# Patient Record
Sex: FEMALE | Race: WHITE | NOT HISPANIC OR LATINO
[De-identification: names, ages, dates, MRNs, and addresses within clinical notes are randomized per-mention and may not be internally consistent; named-entity substitution may affect disease eponyms.]

---

## 2017-01-10 ENCOUNTER — CHARTING TRANS (OUTPATIENT)
Dept: OTHER | Age: 77
End: 2017-01-10

## 2017-01-10 ASSESSMENT — PAIN SCALES - GENERAL: PAINLEVEL_OUTOF10: 0

## 2017-08-11 ENCOUNTER — CHARTING TRANS (OUTPATIENT)
Dept: OTHER | Age: 77
End: 2017-08-11

## 2017-08-14 ENCOUNTER — CHARTING TRANS (OUTPATIENT)
Dept: OTHER | Age: 77
End: 2017-08-14

## 2017-12-20 ENCOUNTER — CHARTING TRANS (OUTPATIENT)
Dept: OTHER | Age: 77
End: 2017-12-20

## 2018-02-26 ENCOUNTER — CHARTING TRANS (OUTPATIENT)
Dept: OTHER | Age: 78
End: 2018-02-26

## 2018-08-17 ENCOUNTER — CHARTING TRANS (OUTPATIENT)
Dept: OTHER | Age: 78
End: 2018-08-17

## 2018-08-27 ENCOUNTER — CHARTING TRANS (OUTPATIENT)
Dept: OTHER | Age: 78
End: 2018-08-27

## 2018-09-14 ENCOUNTER — CHARTING TRANS (OUTPATIENT)
Dept: OTHER | Age: 78
End: 2018-09-14

## 2018-10-09 ENCOUNTER — CHARTING TRANS (OUTPATIENT)
Dept: OTHER | Age: 78
End: 2018-10-09

## 2018-10-31 ENCOUNTER — CHARTING TRANS (OUTPATIENT)
Dept: OTHER | Age: 78
End: 2018-10-31

## 2018-11-01 VITALS
DIASTOLIC BLOOD PRESSURE: 68 MMHG | RESPIRATION RATE: 16 BRPM | BODY MASS INDEX: 26.46 KG/M2 | HEIGHT: 64 IN | HEART RATE: 68 BPM | SYSTOLIC BLOOD PRESSURE: 116 MMHG | WEIGHT: 155 LBS

## 2018-11-03 VITALS
HEIGHT: 64 IN | BODY MASS INDEX: 26.63 KG/M2 | SYSTOLIC BLOOD PRESSURE: 124 MMHG | DIASTOLIC BLOOD PRESSURE: 70 MMHG | HEART RATE: 68 BPM | WEIGHT: 156 LBS

## 2018-11-03 VITALS
HEIGHT: 64 IN | WEIGHT: 155 LBS | BODY MASS INDEX: 26.46 KG/M2 | SYSTOLIC BLOOD PRESSURE: 116 MMHG | DIASTOLIC BLOOD PRESSURE: 86 MMHG

## 2018-11-06 VITALS
HEIGHT: 64 IN | HEART RATE: 78 BPM | WEIGHT: 148 LBS | DIASTOLIC BLOOD PRESSURE: 62 MMHG | RESPIRATION RATE: 16 BRPM | SYSTOLIC BLOOD PRESSURE: 110 MMHG | BODY MASS INDEX: 25.27 KG/M2

## 2018-11-12 ENCOUNTER — CHARTING TRANS (OUTPATIENT)
Dept: OTHER | Age: 78
End: 2018-11-12

## 2018-11-27 VITALS
WEIGHT: 150 LBS | HEART RATE: 68 BPM | OXYGEN SATURATION: 97 % | HEIGHT: 64 IN | BODY MASS INDEX: 25.61 KG/M2 | DIASTOLIC BLOOD PRESSURE: 72 MMHG | RESPIRATION RATE: 16 BRPM | SYSTOLIC BLOOD PRESSURE: 120 MMHG

## 2018-11-27 VITALS
SYSTOLIC BLOOD PRESSURE: 124 MMHG | OXYGEN SATURATION: 97 % | HEART RATE: 73 BPM | BODY MASS INDEX: 27.23 KG/M2 | TEMPERATURE: 97.1 F | DIASTOLIC BLOOD PRESSURE: 72 MMHG | WEIGHT: 159.5 LBS | HEIGHT: 64 IN

## 2018-11-27 VITALS
HEIGHT: 64 IN | BODY MASS INDEX: 26.8 KG/M2 | SYSTOLIC BLOOD PRESSURE: 126 MMHG | RESPIRATION RATE: 16 BRPM | HEART RATE: 68 BPM | DIASTOLIC BLOOD PRESSURE: 84 MMHG | WEIGHT: 157 LBS

## 2018-11-27 VITALS
SYSTOLIC BLOOD PRESSURE: 134 MMHG | TEMPERATURE: 96.9 F | HEART RATE: 68 BPM | HEIGHT: 64 IN | BODY MASS INDEX: 27.04 KG/M2 | WEIGHT: 158.38 LBS | DIASTOLIC BLOOD PRESSURE: 88 MMHG | OXYGEN SATURATION: 98 %

## 2018-12-11 ENCOUNTER — ANCILLARY PROCEDURE (OUTPATIENT)
Dept: CARDIOLOGY | Age: 78
End: 2018-12-11
Attending: INTERNAL MEDICINE

## 2018-12-11 DIAGNOSIS — I44.2 AV BLOCK, 3RD DEGREE (CMD): ICD-10-CM

## 2018-12-11 PROCEDURE — 93280 PM DEVICE PROGR EVAL DUAL: CPT | Performed by: INTERNAL MEDICINE

## 2019-01-02 ENCOUNTER — TELEPHONE (OUTPATIENT)
Dept: CARDIOLOGY | Age: 79
End: 2019-01-02

## 2019-01-03 ENCOUNTER — TELEPHONE (OUTPATIENT)
Dept: NEUROLOGY | Age: 79
End: 2019-01-03

## 2019-01-03 RX ORDER — BUTORPHANOL TARTRATE 10 MG/ML
SPRAY NASAL
Qty: 5 ML | Refills: 0
Start: 2019-01-03 | End: 2019-08-22 | Stop reason: SDUPTHER

## 2019-01-03 RX ORDER — BUTORPHANOL TARTRATE 10 MG/ML
SPRAY NASAL
COMMUNITY
End: 2019-01-03 | Stop reason: SDUPTHER

## 2019-01-14 ENCOUNTER — TELEPHONE (OUTPATIENT)
Dept: CARDIOLOGY | Age: 79
End: 2019-01-14

## 2019-01-27 RX ORDER — LANOLIN ALCOHOL/MO/W.PET/CERES
CREAM (GRAM) TOPICAL
COMMUNITY
Start: 2018-10-09 | End: 2019-10-09

## 2019-01-27 RX ORDER — MAGNESIUM OXIDE 400 MG/1
TABLET ORAL
COMMUNITY
Start: 2018-10-02 | End: 2019-10-02

## 2019-01-27 RX ORDER — QUETIAPINE FUMARATE 25 MG/1
TABLET, FILM COATED ORAL
COMMUNITY
Start: 2018-10-09 | End: 2019-04-17 | Stop reason: SDUPTHER

## 2019-01-27 RX ORDER — POTASSIUM CHLORIDE 20 MEQ/1
TABLET, EXTENDED RELEASE ORAL
COMMUNITY
Start: 2018-02-13 | End: 2019-04-03 | Stop reason: SDUPTHER

## 2019-01-28 RX ORDER — AMLODIPINE BESYLATE 5 MG/1
TABLET ORAL
COMMUNITY
End: 2019-11-06 | Stop reason: SDUPTHER

## 2019-02-19 ENCOUNTER — TELEPHONE (OUTPATIENT)
Dept: NEUROLOGY | Age: 79
End: 2019-02-19

## 2019-03-29 ENCOUNTER — TELEPHONE (OUTPATIENT)
Dept: OTOLARYNGOLOGY | Age: 79
End: 2019-03-29

## 2019-04-01 ENCOUNTER — ANCILLARY PROCEDURE (OUTPATIENT)
Dept: CARDIOLOGY | Age: 79
End: 2019-04-01
Attending: INTERNAL MEDICINE

## 2019-04-01 DIAGNOSIS — I48.91 ATRIAL FIBRILLATION AND FLUTTER (CMD): ICD-10-CM

## 2019-04-01 DIAGNOSIS — I48.92 ATRIAL FIBRILLATION AND FLUTTER (CMD): ICD-10-CM

## 2019-04-01 DIAGNOSIS — I44.2 ATRIOVENTRICULAR BLOCK, COMPLETE (CMD): ICD-10-CM

## 2019-04-01 PROCEDURE — 93294 REM INTERROG EVL PM/LDLS PM: CPT | Performed by: INTERNAL MEDICINE

## 2019-04-02 ENCOUNTER — OFFICE VISIT (OUTPATIENT)
Dept: OTOLARYNGOLOGY | Age: 79
End: 2019-04-02

## 2019-04-02 VITALS
OXYGEN SATURATION: 97 % | WEIGHT: 148 LBS | TEMPERATURE: 97.6 F | SYSTOLIC BLOOD PRESSURE: 118 MMHG | HEART RATE: 72 BPM | BODY MASS INDEX: 25.27 KG/M2 | DIASTOLIC BLOOD PRESSURE: 74 MMHG | HEIGHT: 64 IN

## 2019-04-02 DIAGNOSIS — R04.0 EPISTAXIS: Primary | ICD-10-CM

## 2019-04-02 PROCEDURE — 99213 OFFICE O/P EST LOW 20 MIN: CPT | Performed by: NURSE PRACTITIONER

## 2019-04-02 RX ORDER — SIMVASTATIN 40 MG
40 TABLET ORAL NIGHTLY
COMMUNITY
Start: 2018-04-03

## 2019-04-02 RX ORDER — ECHINACEA 400 MG
2 CAPSULE ORAL DAILY
COMMUNITY

## 2019-04-02 RX ORDER — HYDROCODONE BITARTRATE AND ACETAMINOPHEN 5; 325 MG/1; MG/1
TABLET ORAL
Refills: 0 | COMMUNITY
Start: 2019-03-22 | End: 2019-12-12 | Stop reason: ALTCHOICE

## 2019-04-02 RX ORDER — LANOLIN ALCOHOL/MO/W.PET/CERES
3 CREAM (GRAM) TOPICAL NIGHTLY
COMMUNITY

## 2019-04-02 RX ORDER — ALENDRONATE SODIUM 70 MG/1
TABLET ORAL
COMMUNITY
Start: 2018-10-02 | End: 2019-12-12

## 2019-04-02 RX ORDER — ZOLPIDEM TARTRATE 5 MG/1
TABLET ORAL
Refills: 1 | COMMUNITY
Start: 2019-02-28 | End: 2019-08-02 | Stop reason: SDUPTHER

## 2019-04-02 ASSESSMENT — ENCOUNTER SYMPTOMS
GASTROINTESTINAL NEGATIVE: 1
CONSTITUTIONAL NEGATIVE: 1
EYES NEGATIVE: 1
RESPIRATORY NEGATIVE: 1
PSYCHIATRIC NEGATIVE: 1

## 2019-04-03 RX ORDER — POTASSIUM CHLORIDE 20 MEQ/1
TABLET, EXTENDED RELEASE ORAL
Qty: 90 TABLET | Refills: 1 | Status: SHIPPED | OUTPATIENT
Start: 2019-04-03

## 2019-04-16 ENCOUNTER — TELEPHONE (OUTPATIENT)
Dept: NEUROLOGY | Age: 79
End: 2019-04-16

## 2019-04-17 RX ORDER — QUETIAPINE FUMARATE 25 MG/1
50 TABLET, FILM COATED ORAL DAILY
Qty: 60 TABLET | Refills: 1 | Status: SHIPPED | OUTPATIENT
Start: 2019-04-17 | End: 2019-05-17

## 2019-04-23 PROCEDURE — 93296 REM INTERROG EVL PM/IDS: CPT | Performed by: INTERNAL MEDICINE

## 2019-05-21 ENCOUNTER — OFFICE VISIT (OUTPATIENT)
Dept: NEUROLOGY | Age: 79
End: 2019-05-21

## 2019-05-21 VITALS
DIASTOLIC BLOOD PRESSURE: 70 MMHG | BODY MASS INDEX: 25.61 KG/M2 | SYSTOLIC BLOOD PRESSURE: 104 MMHG | HEIGHT: 64 IN | WEIGHT: 150 LBS

## 2019-05-21 DIAGNOSIS — I73.9 SMALL VESSEL DISEASE (CMD): ICD-10-CM

## 2019-05-21 DIAGNOSIS — F03.91 DEMENTIA WITH BEHAVIORAL DISTURBANCE, UNSPECIFIED DEMENTIA TYPE: Primary | ICD-10-CM

## 2019-05-21 DIAGNOSIS — F31.9 BIPOLAR AFFECTIVE DISORDER, REMISSION STATUS UNSPECIFIED (CMD): ICD-10-CM

## 2019-05-21 DIAGNOSIS — G43.909 MIGRAINE WITHOUT STATUS MIGRAINOSUS, NOT INTRACTABLE, UNSPECIFIED MIGRAINE TYPE: ICD-10-CM

## 2019-05-21 DIAGNOSIS — E53.8 VITAMIN B12 DEFICIENCY: ICD-10-CM

## 2019-05-21 PROBLEM — F03.918 DEMENTIA WITH BEHAVIORAL DISTURBANCE (CMD): Status: ACTIVE | Noted: 2019-05-21

## 2019-05-21 PROBLEM — G25.81 RLS (RESTLESS LEGS SYNDROME): Status: ACTIVE | Noted: 2019-05-21

## 2019-05-21 PROBLEM — G25.81 RLS (RESTLESS LEGS SYNDROME): Status: RESOLVED | Noted: 2019-05-21 | Resolved: 2019-05-21

## 2019-05-21 PROCEDURE — 99214 OFFICE O/P EST MOD 30 MIN: CPT | Performed by: SPECIALIST

## 2019-05-21 RX ORDER — QUETIAPINE FUMARATE 25 MG/1
25 TABLET, FILM COATED ORAL
COMMUNITY
End: 2019-06-26 | Stop reason: SDUPTHER

## 2019-05-21 ASSESSMENT — ENCOUNTER SYMPTOMS
WEAKNESS: 0
EYES NEGATIVE: 1
NUMBNESS: 0
GASTROINTESTINAL NEGATIVE: 1
PSYCHIATRIC NEGATIVE: 1
HEADACHES: 1
SPEECH DIFFICULTY: 0
RESPIRATORY NEGATIVE: 1
CONSTITUTIONAL NEGATIVE: 1
DIZZINESS: 0
ENDOCRINE NEGATIVE: 1

## 2019-05-21 ASSESSMENT — MINI MENTAL STATE EXAM: SUM ALL MMSE QUESTIONS FOR TOTAL SCORE [OUT OF 30].: 18

## 2019-05-21 ASSESSMENT — VISUAL ACUITY: VA_NORMAL: 1

## 2019-06-26 ENCOUNTER — TELEPHONE (OUTPATIENT)
Dept: NEUROLOGY | Age: 79
End: 2019-06-26

## 2019-06-26 RX ORDER — QUETIAPINE FUMARATE 25 MG/1
50 TABLET, FILM COATED ORAL AT BEDTIME
Qty: 180 TABLET | Refills: 1 | Status: SHIPPED | OUTPATIENT
Start: 2019-06-26 | End: 2019-09-24

## 2019-07-01 ENCOUNTER — ANCILLARY PROCEDURE (OUTPATIENT)
Dept: CARDIOLOGY | Age: 79
End: 2019-07-01
Attending: INTERNAL MEDICINE

## 2019-07-01 DIAGNOSIS — I44.2 ATRIOVENTRICULAR BLOCK, COMPLETE (CMD): Primary | ICD-10-CM

## 2019-07-01 DIAGNOSIS — I48.92 ATRIAL FIBRILLATION AND FLUTTER (CMD): ICD-10-CM

## 2019-07-01 DIAGNOSIS — Z95.0 PRESENCE OF CARDIAC PACEMAKER: ICD-10-CM

## 2019-07-01 DIAGNOSIS — I48.91 ATRIAL FIBRILLATION AND FLUTTER (CMD): ICD-10-CM

## 2019-07-22 ENCOUNTER — TELEPHONE (OUTPATIENT)
Dept: CARDIOLOGY | Age: 79
End: 2019-07-22

## 2019-08-02 ENCOUNTER — TELEPHONE (OUTPATIENT)
Dept: NEUROLOGY | Age: 79
End: 2019-08-02

## 2019-08-02 RX ORDER — ZOLPIDEM TARTRATE 5 MG/1
2.5 TABLET ORAL NIGHTLY PRN
Qty: 45 TABLET | Refills: 0
Start: 2019-08-02 | End: 2019-10-31 | Stop reason: SDUPTHER

## 2019-08-22 ENCOUNTER — TELEPHONE (OUTPATIENT)
Dept: NEUROLOGY | Age: 79
End: 2019-08-22

## 2019-08-22 DIAGNOSIS — G43.909 MIGRAINE WITHOUT STATUS MIGRAINOSUS, NOT INTRACTABLE, UNSPECIFIED MIGRAINE TYPE: Primary | ICD-10-CM

## 2019-08-22 RX ORDER — BUTORPHANOL TARTRATE 10 MG/ML
SPRAY NASAL
Qty: 5 ML | Refills: 0 | Status: SHIPPED
Start: 2019-08-22

## 2019-10-01 ENCOUNTER — ANCILLARY PROCEDURE (OUTPATIENT)
Dept: CARDIOLOGY | Age: 79
End: 2019-10-01
Attending: INTERNAL MEDICINE

## 2019-10-01 DIAGNOSIS — I44.2 ATRIOVENTRICULAR BLOCK, COMPLETE (CMD): ICD-10-CM

## 2019-10-01 DIAGNOSIS — I48.92 ATRIAL FIBRILLATION AND FLUTTER (CMD): ICD-10-CM

## 2019-10-01 DIAGNOSIS — Z95.0 CARDIAC PACEMAKER IN SITU: ICD-10-CM

## 2019-10-01 DIAGNOSIS — I48.91 ATRIAL FIBRILLATION AND FLUTTER (CMD): ICD-10-CM

## 2019-10-01 PROCEDURE — 93296 REM INTERROG EVL PM/IDS: CPT | Performed by: INTERNAL MEDICINE

## 2019-10-11 PROCEDURE — 93294 REM INTERROG EVL PM/LDLS PM: CPT | Performed by: INTERNAL MEDICINE

## 2019-10-31 RX ORDER — ZOLPIDEM TARTRATE 5 MG/1
2.5 TABLET ORAL NIGHTLY PRN
Qty: 45 TABLET | Refills: 0 | Status: SHIPPED
Start: 2019-10-31 | End: 2020-01-02 | Stop reason: SDUPTHER

## 2019-11-06 RX ORDER — AMLODIPINE BESYLATE 5 MG/1
TABLET ORAL
Qty: 90 TABLET | Refills: 0 | Status: SHIPPED | OUTPATIENT
Start: 2019-11-06 | End: 2020-02-26 | Stop reason: SDUPTHER

## 2019-12-03 ENCOUNTER — OFFICE VISIT (OUTPATIENT)
Dept: NEUROLOGY | Age: 79
End: 2019-12-03

## 2019-12-03 VITALS
DIASTOLIC BLOOD PRESSURE: 60 MMHG | BODY MASS INDEX: 25.61 KG/M2 | WEIGHT: 150 LBS | HEIGHT: 64 IN | SYSTOLIC BLOOD PRESSURE: 110 MMHG

## 2019-12-03 DIAGNOSIS — F31.9 BIPOLAR AFFECTIVE DISORDER, REMISSION STATUS UNSPECIFIED (CMD): ICD-10-CM

## 2019-12-03 DIAGNOSIS — G43.909 MIGRAINE WITHOUT STATUS MIGRAINOSUS, NOT INTRACTABLE, UNSPECIFIED MIGRAINE TYPE: ICD-10-CM

## 2019-12-03 DIAGNOSIS — E53.8 VITAMIN B12 DEFICIENCY: ICD-10-CM

## 2019-12-03 DIAGNOSIS — I73.9 SMALL VESSEL DISEASE (CMD): ICD-10-CM

## 2019-12-03 DIAGNOSIS — F03.91 DEMENTIA WITH BEHAVIORAL DISTURBANCE, UNSPECIFIED DEMENTIA TYPE: Primary | ICD-10-CM

## 2019-12-03 PROCEDURE — 99214 OFFICE O/P EST MOD 30 MIN: CPT | Performed by: SPECIALIST

## 2019-12-03 ASSESSMENT — ENCOUNTER SYMPTOMS
WEAKNESS: 0
PSYCHIATRIC NEGATIVE: 1
CONSTITUTIONAL NEGATIVE: 1
ENDOCRINE NEGATIVE: 1
RESPIRATORY NEGATIVE: 1
DIZZINESS: 0
SPEECH DIFFICULTY: 0
EYES NEGATIVE: 1
GASTROINTESTINAL NEGATIVE: 1
NUMBNESS: 0
HEADACHES: 1

## 2019-12-05 RX ORDER — MAGNESIUM OXIDE 400 MG/1
TABLET ORAL
Qty: 180 TABLET | Refills: 0 | Status: SHIPPED | OUTPATIENT
Start: 2019-12-05 | End: 2020-02-26 | Stop reason: SDUPTHER

## 2019-12-10 ENCOUNTER — ANCILLARY PROCEDURE (OUTPATIENT)
Dept: CARDIOLOGY | Age: 79
End: 2019-12-10
Attending: INTERNAL MEDICINE

## 2019-12-10 DIAGNOSIS — I48.91 ATRIAL FIBRILLATION AND FLUTTER (CMD): ICD-10-CM

## 2019-12-10 DIAGNOSIS — I48.92 ATRIAL FIBRILLATION AND FLUTTER (CMD): ICD-10-CM

## 2019-12-10 DIAGNOSIS — I44.2 ATRIOVENTRICULAR BLOCK, COMPLETE (CMD): ICD-10-CM

## 2019-12-12 ENCOUNTER — OFFICE VISIT (OUTPATIENT)
Dept: CARDIOLOGY | Age: 79
End: 2019-12-12

## 2019-12-12 VITALS
SYSTOLIC BLOOD PRESSURE: 102 MMHG | DIASTOLIC BLOOD PRESSURE: 62 MMHG | HEART RATE: 60 BPM | BODY MASS INDEX: 27.31 KG/M2 | HEIGHT: 64 IN | WEIGHT: 160 LBS

## 2019-12-12 DIAGNOSIS — I48.0 PAROXYSMAL ATRIAL FIBRILLATION (CMD): Primary | ICD-10-CM

## 2019-12-12 DIAGNOSIS — I10 ESSENTIAL HYPERTENSION: ICD-10-CM

## 2019-12-12 DIAGNOSIS — Z95.0 HISTORY OF CARDIAC PACEMAKER IN SITU: ICD-10-CM

## 2019-12-12 DIAGNOSIS — E78.00 PURE HYPERCHOLESTEROLEMIA: ICD-10-CM

## 2019-12-12 PROCEDURE — 93000 ELECTROCARDIOGRAM COMPLETE: CPT | Performed by: INTERNAL MEDICINE

## 2019-12-12 PROCEDURE — 99214 OFFICE O/P EST MOD 30 MIN: CPT | Performed by: INTERNAL MEDICINE

## 2019-12-12 RX ORDER — METOPROLOL SUCCINATE 25 MG/1
25 TABLET, EXTENDED RELEASE ORAL DAILY
COMMUNITY

## 2019-12-12 RX ORDER — QUETIAPINE FUMARATE 25 MG/1
50 TABLET, FILM COATED ORAL AT BEDTIME
COMMUNITY
End: 2020-06-09

## 2019-12-12 SDOH — HEALTH STABILITY: MENTAL HEALTH: HOW OFTEN DO YOU HAVE A DRINK CONTAINING ALCOHOL?: 2-4 TIMES A MONTH

## 2019-12-12 SDOH — HEALTH STABILITY: MENTAL HEALTH: HOW OFTEN DO YOU HAVE 6 OR MORE DRINKS ON ONE OCCASION?: NEVER

## 2019-12-12 SDOH — HEALTH STABILITY: MENTAL HEALTH: HOW MANY STANDARD DRINKS CONTAINING ALCOHOL DO YOU HAVE ON A TYPICAL DAY?: 1 OR 2

## 2019-12-12 ASSESSMENT — ENCOUNTER SYMPTOMS
TREMORS: 0
WEAKNESS: 0
LIGHT-HEADEDNESS: 0
SHORTNESS OF BREATH: 0
ACTIVITY CHANGE: 0
BACK PAIN: 0
CHEST TIGHTNESS: 0
ABDOMINAL PAIN: 0
DIZZINESS: 0
COLOR CHANGE: 0
SLEEP DISTURBANCE: 0

## 2020-01-02 ENCOUNTER — TELEPHONE (OUTPATIENT)
Dept: NEUROLOGY | Age: 80
End: 2020-01-02

## 2020-01-02 RX ORDER — ZOLPIDEM TARTRATE 5 MG/1
2.5 TABLET ORAL NIGHTLY PRN
Qty: 45 TABLET | Refills: 0 | Status: SHIPPED
Start: 2020-01-02 | End: 2020-03-13

## 2020-02-26 DIAGNOSIS — I10 ESSENTIAL HYPERTENSION: Primary | ICD-10-CM

## 2020-02-26 DIAGNOSIS — I48.0 PAROXYSMAL ATRIAL FIBRILLATION (CMD): ICD-10-CM

## 2020-02-26 RX ORDER — MAGNESIUM OXIDE 400 MG/1
TABLET ORAL
Qty: 180 TABLET | Refills: 3 | Status: SHIPPED | OUTPATIENT
Start: 2020-02-26

## 2020-02-26 RX ORDER — AMLODIPINE BESYLATE 5 MG/1
TABLET ORAL
Qty: 90 TABLET | Refills: 3 | Status: SHIPPED | OUTPATIENT
Start: 2020-02-26

## 2020-03-02 ENCOUNTER — ANCILLARY PROCEDURE (OUTPATIENT)
Dept: CARDIOLOGY | Age: 80
End: 2020-03-02
Attending: INTERNAL MEDICINE

## 2020-03-02 DIAGNOSIS — Z95.0 PACEMAKER: ICD-10-CM

## 2020-03-02 DIAGNOSIS — I48.92 ATRIAL FIBRILLATION AND FLUTTER (CMD): ICD-10-CM

## 2020-03-02 DIAGNOSIS — I44.2 ATRIOVENTRICULAR BLOCK, COMPLETE (CMD): ICD-10-CM

## 2020-03-02 DIAGNOSIS — I48.91 ATRIAL FIBRILLATION AND FLUTTER (CMD): ICD-10-CM

## 2020-03-02 PROCEDURE — 93296 REM INTERROG EVL PM/IDS: CPT | Performed by: INTERNAL MEDICINE

## 2020-03-11 PROCEDURE — 93294 REM INTERROG EVL PM/LDLS PM: CPT | Performed by: INTERNAL MEDICINE

## 2020-03-13 RX ORDER — ZOLPIDEM TARTRATE 5 MG/1
TABLET ORAL
Qty: 45 TABLET | Refills: 0 | Status: SHIPPED
Start: 2020-03-13 | End: 2020-06-02

## 2020-06-02 ENCOUNTER — ANCILLARY PROCEDURE (OUTPATIENT)
Dept: CARDIOLOGY | Age: 80
End: 2020-06-02
Attending: INTERNAL MEDICINE

## 2020-06-02 DIAGNOSIS — I48.91 ATRIAL FIBRILLATION AND FLUTTER (CMD): ICD-10-CM

## 2020-06-02 DIAGNOSIS — I48.92 ATRIAL FIBRILLATION AND FLUTTER (CMD): ICD-10-CM

## 2020-06-02 DIAGNOSIS — I44.2 ATRIOVENTRICULAR BLOCK, COMPLETE (CMD): ICD-10-CM

## 2020-06-02 PROCEDURE — 93296 REM INTERROG EVL PM/IDS: CPT | Performed by: INTERNAL MEDICINE

## 2020-06-02 RX ORDER — ZOLPIDEM TARTRATE 5 MG/1
TABLET ORAL
Qty: 45 TABLET | Refills: 0 | Status: SHIPPED
Start: 2020-06-12

## 2020-06-09 RX ORDER — QUETIAPINE FUMARATE 25 MG/1
TABLET, FILM COATED ORAL
Qty: 180 TABLET | Refills: 0 | Status: SHIPPED | OUTPATIENT
Start: 2020-06-09

## 2020-06-24 PROCEDURE — 93294 REM INTERROG EVL PM/LDLS PM: CPT | Performed by: INTERNAL MEDICINE

## 2020-12-08 ENCOUNTER — APPOINTMENT (OUTPATIENT)
Dept: CARDIOLOGY | Age: 80
End: 2020-12-08
Attending: INTERNAL MEDICINE

## 2021-12-09 ENCOUNTER — APPOINTMENT (OUTPATIENT)
Dept: CT IMAGING | Facility: HOSPITAL | Age: 81
End: 2021-12-09
Attending: EMERGENCY MEDICINE
Payer: MEDICARE

## 2021-12-09 ENCOUNTER — HOSPITAL ENCOUNTER (EMERGENCY)
Facility: HOSPITAL | Age: 81
Discharge: HOME | End: 2021-12-09
Attending: EMERGENCY MEDICINE
Payer: MEDICARE

## 2021-12-09 VITALS
HEART RATE: 85 BPM | HEIGHT: 64 IN | BODY MASS INDEX: 23.05 KG/M2 | DIASTOLIC BLOOD PRESSURE: 60 MMHG | TEMPERATURE: 98 F | WEIGHT: 135 LBS | OXYGEN SATURATION: 98 % | RESPIRATION RATE: 20 BRPM | SYSTOLIC BLOOD PRESSURE: 125 MMHG
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
## 2021-12-09 PROCEDURE — 72125 CT NECK SPINE W/O DYE: CPT | Performed by: EMERGENCY MEDICINE

## 2021-12-09 PROCEDURE — 99284 EMERGENCY DEPT VISIT MOD MDM: CPT

## 2021-12-09 PROCEDURE — 70450 CT HEAD/BRAIN W/O DYE: CPT | Performed by: EMERGENCY MEDICINE

## 2021-12-09 NOTE — ED NOTES
Spoke to Rn at [facility] rivera at [facility] to give report on patient and state that patient will be sent home.

## 2021-12-09 NOTE — CM/SW NOTE
BLS arranged for pt's return to [facility] rivera at [facility]. BLS ETA 60-90MIN. PCS completed in epic. ESPERANZA Tobin RN notified of all of the above.

## 2021-12-09 NOTE — ED INITIAL ASSESSMENT (HPI)
Pt is Ax1 per norm. Patient had unwitnessed fall. Nurses responded right away. Denies los of consciousness. On blood thinner. Denies pain.

## 2021-12-09 NOTE — ED QUICK NOTES
Patient was not available for their therapy session at this time.  Reason not seen: Patient requesting no therapy today (05/15/17 1009). Patient received with eyes closed, declined to open eyes to engage with writer, stating \"sleeping\" repeatedly. Re-Attempt Plan: Will re-attempt later today;Will re-attempt per established treatment plan (05/15/17 1009).   Superior at bedside.

## 2021-12-09 NOTE — ED PROVIDER NOTES
Patient Seen in: [facility] Emergency Department      History   Patient presents with:  Fall    Stated Complaint:     Subjective:   HPI    Pt is [de-identified] yo F who arrives collared by EMS s/p unwitnessed fall at [facility].  Pt with dementia and history ecchymosis  Extremities: FROM of all extremities, no cyanosis/clubbing/edema  Neuro: CN intact, normal speech, 5/5 motor strength in all extremities, no focal deficits  SKIN: warm, dry, no rashes        ED Course   Labs Reviewed - No data to display
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
## 2022-03-12 PROBLEM — F39 EPISODIC MOOD DISORDER: Status: ACTIVE | Noted: 2022-03-12

## 2022-03-12 PROBLEM — F01.51 VASCULAR DEMENTIA WITH BEHAVIORAL DISTURBANCE (HCC): Status: ACTIVE | Noted: 2022-03-12

## 2022-03-12 PROBLEM — F01.518 VASCULAR DEMENTIA WITH BEHAVIORAL DISTURBANCE (HCC): Status: ACTIVE | Noted: 2022-03-12

## 2022-03-12 PROBLEM — F39 EPISODIC MOOD DISORDER (HCC): Status: ACTIVE | Noted: 2022-03-12

## 2022-03-12 PROBLEM — F03.911 AGITATION DUE TO DEMENTIA (HCC): Status: ACTIVE | Noted: 2022-03-12

## 2022-03-12 PROBLEM — F03.91 AGITATION DUE TO DEMENTIA (HCC): Status: ACTIVE | Noted: 2022-03-12
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
## 2022-03-12 NOTE — ED QUICK NOTES
Pt placed back into the bed with security's assistance, soft wrist restraints applied and haldol administered.
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
## 2022-03-12 NOTE — ED QUICK NOTES
Elimination assistance provided to patient with ed tech. Sitter at bedside. Patient medicated per mar.  Will continue to monitor

## 2022-03-13 PROBLEM — F03.91 DEMENTIA WITH BEHAVIORAL DISTURBANCE (HCC): Status: ACTIVE | Noted: 2022-03-13

## 2022-03-13 PROBLEM — F03.918 DEMENTIA WITH BEHAVIORAL DISTURBANCE (HCC): Status: ACTIVE | Noted: 2022-03-13

## 2022-03-13 NOTE — ED QUICK NOTES
Pt transferred to [facility] at this time, belongings sent with pt

## 2022-03-13 NOTE — PROGRESS NOTES
03/13/22 1032   COVID Exposure Risk Screening   Do you have any of the following new or worsening symptoms of COVID-19? None   Have you been diagnosed with COVID-19 within the past 10 days? No   Are you awaiting COVID-19 test results or do you have a COVID-19 test scheduled? No   In the past 10 days, have you been in contact with someone who was confirmed or suspected to have COVID-19? No   Patient is fully vaccinated and boosted.

## 2022-03-13 NOTE — ED QUICK NOTES
Report given to Autumn SALCIDO of [facility], pt was accepted by Dr Dustin Urrutia and is going to room 362O

## 2022-03-13 NOTE — ED PROVIDER NOTES
Patient endorsed to me by Dr. Jaiden Bridges for continuation of care. Patient is awaiting inpatient psychiatric transfer for Aggressive behavior and has been calm throughout my shift. Patient endorsed to Dr. Kendrick Mathews for continuation of care.

## 2022-03-13 NOTE — ED NOTES
Patient signed out awaiting psychiatric transfer.       Results for orders placed or performed during the hospital encounter of 03/12/22   Ethyl Alcohol    Collection Time: 03/12/22  1:00 PM   Result Value Ref Range    Ethyl Alcohol <3 <3 mg/dL   Acetaminophen (Tylenol), S    Collection Time: 03/12/22  1:00 PM   Result Value Ref Range    Acetaminophen <2.0 (L) 10.0 - 32.8 ug/mL   Salicylate, Serum    Collection Time: 03/12/22  1:00 PM   Result Value Ref Range    Salicylate <6.5 (L) 2.8 - 20.0 mg/dL   Drug Abuse Panel 10 screen    Collection Time: 03/12/22  1:00 PM   Result Value Ref Range    Amphetamine Urine Negative Negative    Barbiturates Urine Negative Negative    Benzodiazepines Urine Negative Negative    Cannabinoid Urine Negative Negative    Cocaine Urine Negative Negative    Ecstasy Urine Negative Negative    Methadone Urine Negative Negative    Opiate Urine Negative Negative    Oxycodone Urine Negative Negative    PCP Urine Negative Negative    Creatinine Ur Random 98.10 mg/dL   CBC W/ DIFFERENTIAL    Collection Time: 03/12/22  1:00 PM   Result Value Ref Range    WBC 10.1 4.0 - 11.0 x10(3) uL    RBC 4.42 3.80 - 5.30 x10(6)uL    HGB 13.2 12.0 - 16.0 g/dL    HCT 40.1 35.0 - 48.0 %    MCV 90.7 80.0 - 100.0 fL    MCH 29.9 26.0 - 34.0 pg    MCHC 32.9 31.0 - 37.0 g/dL    RDW-SD 42.9 35.1 - 46.3 fL    RDW 12.9 11.0 - 15.0 %    .0 150.0 - 450.0 10(3)uL    Neutrophil Absolute Prelim 7.97 (H) 1.50 - 7.70 x10 (3) uL    Neutrophil Absolute 7.97 (H) 1.50 - 7.70 x10(3) uL    Lymphocyte Absolute 1.53 1.00 - 4.00 x10(3) uL    Monocyte Absolute 0.45 0.10 - 1.00 x10(3) uL    Eosinophil Absolute 0.13 0.00 - 0.70 x10(3) uL    Basophil Absolute 0.04 0.00 - 0.20 x10(3) uL    Immature Granulocyte Absolute 0.02 0.00 - 1.00 x10(3) uL    Neutrophil % 78.6 %    Lymphocyte % 15.1 %    Monocyte % 4.4 %    Eosinophil % 1.3 %    Basophil % 0.4 %    Immature Granulocyte % 0.2 %   Urinalysis with Culture Reflex    Collection Time: 03/12/22  1:01 PM    Specimen: Urine   Result Value Ref Range    Urine Color Yellow Yellow    Clarity Urine Cloudy (A) Clear    Spec Gravity 1.011 1.001 - 1.030    Glucose Urine Negative Negative mg/dL    Bilirubin Urine Negative Negative    Ketones Urine Negative Negative mg/dL    Blood Urine Negative Negative    pH Urine 8.0 5.0 - 8.0    Protein Urine Negative Negative mg/dL    Urobilinogen Urine <2.0 <2.0    Nitrite Urine Negative Negative    Leukocyte Esterase Urine Negative Negative    Microscopic Microscopic not indicated     Ascorbic Acid Urine Negative Negative mg/dL   Basic Metabolic Panel (8)    Collection Time: 03/12/22  1:04 PM   Result Value Ref Range    Glucose 147 (H) 70 - 99 mg/dL    Sodium 140 136 - 145 mmol/L    Potassium 3.3 (L) 3.5 - 5.1 mmol/L    Chloride 109 98 - 112 mmol/L    CO2 24.0 21.0 - 32.0 mmol/L    Anion Gap 7 0 - 18 mmol/L    BUN 14 7 - 18 mg/dL    Creatinine 0.85 0.55 - 1.02 mg/dL    BUN/CREA Ratio 16.5 10.0 - 20.0    Calcium, Total 8.9 8.5 - 10.1 mg/dL    Calculated Osmolality 293 275 - 295 mOsm/kg    GFR, Non- 65 >=60    GFR, -American 75 >=60   Hepatic Function Panel (7)    Collection Time: 03/12/22  1:04 PM   Result Value Ref Range    AST 19 15 - 37 U/L    ALT 19 13 - 56 U/L    Alkaline Phosphatase 99 55 - 142 U/L    Bilirubin, Total 0.4 0.1 - 2.0 mg/dL    Bilirubin, Direct 0.2 0.0 - 0.2 mg/dL    Total Protein 6.4 6.4 - 8.2 g/dL    Albumin 3.3 (L) 3.4 - 5.0 g/dL   SARS-CoV-2/Flu A and B/RSV by PCR (GeneXpert)    Collection Time: 03/12/22  9:35 PM    Specimen: Nasopharyngeal swab; Other   Result Value Ref Range    SARS-CoV-2 (COVID-19) - (GeneXpert) Not Detected Not Detected    Influenza A by PCR Negative Negative    Influenza B by PCR Negative Negative    RSV by PCR Negative Negative       Hemodynamically stable, no distress. Psychiatry is placed standing orders .     Endorsed oncoming ER physician

## 2022-03-14 PROBLEM — F02.81 ALZHEIMER'S DEMENTIA WITH BEHAVIORAL DISTURBANCE (HCC): Chronic | Status: ACTIVE | Noted: 2022-03-13

## 2022-03-14 PROBLEM — F03.91 AGITATION DUE TO DEMENTIA (HCC): Status: RESOLVED | Noted: 2022-03-12 | Resolved: 2022-03-14

## 2022-03-14 PROBLEM — F39 EPISODIC MOOD DISORDER: Status: RESOLVED | Noted: 2022-03-12 | Resolved: 2022-03-14

## 2022-03-14 PROBLEM — F31.2 SEVERE MANIC BIPOLAR I DISORDER WITH PSYCHOTIC FEATURES (HCC): Chronic | Status: ACTIVE | Noted: 2022-03-14

## 2022-03-14 PROBLEM — F03.911 AGITATION DUE TO DEMENTIA (HCC): Status: RESOLVED | Noted: 2022-03-12 | Resolved: 2022-03-14

## 2022-03-14 PROBLEM — G30.9 ALZHEIMER'S DEMENTIA WITH BEHAVIORAL DISTURBANCE (HCC): Chronic | Status: ACTIVE | Noted: 2022-03-13

## 2022-03-14 PROBLEM — F39 EPISODIC MOOD DISORDER (HCC): Status: RESOLVED | Noted: 2022-03-12 | Resolved: 2022-03-14

## 2022-03-14 PROBLEM — F01.51 VASCULAR DEMENTIA WITH BEHAVIORAL DISTURBANCE (HCC): Status: RESOLVED | Noted: 2022-03-12 | Resolved: 2022-03-14

## 2022-03-14 PROBLEM — F01.518 VASCULAR DEMENTIA WITH BEHAVIORAL DISTURBANCE (HCC): Status: RESOLVED | Noted: 2022-03-12 | Resolved: 2022-03-14

## 2022-03-14 PROBLEM — F02.818 ALZHEIMER'S DEMENTIA WITH BEHAVIORAL DISTURBANCE (HCC): Chronic | Status: ACTIVE | Noted: 2022-03-13

## 2025-01-31 ENCOUNTER — APPOINTMENT (OUTPATIENT)
Dept: CT IMAGING | Facility: HOSPITAL | Age: 85
End: 2025-01-31
Attending: EMERGENCY MEDICINE
Payer: MEDICARE

## 2025-01-31 ENCOUNTER — APPOINTMENT (OUTPATIENT)
Dept: GENERAL RADIOLOGY | Facility: HOSPITAL | Age: 85
End: 2025-01-31
Attending: EMERGENCY MEDICINE
Payer: MEDICARE

## 2025-01-31 ENCOUNTER — HOSPITAL ENCOUNTER (INPATIENT)
Facility: HOSPITAL | Age: 85
LOS: 3 days | Discharge: HOSPICE | End: 2025-02-03
Attending: EMERGENCY MEDICINE | Admitting: HOSPITALIST
Payer: MEDICARE

## 2025-01-31 DIAGNOSIS — F03.911 DEMENTIA WITH AGITATION, UNSPECIFIED DEMENTIA SEVERITY, UNSPECIFIED DEMENTIA TYPE (HCC): ICD-10-CM

## 2025-01-31 DIAGNOSIS — S01.01XA LACERATION OF SCALP, INITIAL ENCOUNTER: ICD-10-CM

## 2025-01-31 DIAGNOSIS — S06.5XAA ACUTE SUBDURAL HEMATOMA (HCC): Primary | ICD-10-CM

## 2025-01-31 DIAGNOSIS — S72.011A CLOSED SUBCAPITAL FRACTURE OF RIGHT FEMUR, INITIAL ENCOUNTER (HCC): ICD-10-CM

## 2025-01-31 LAB
ALBUMIN SERPL-MCNC: 4.7 G/DL (ref 3.2–4.8)
ALBUMIN/GLOB SERPL: 1.5 {RATIO} (ref 1–2)
ALP LIVER SERPL-CCNC: 125 U/L
ALT SERPL-CCNC: 17 U/L
ANION GAP SERPL CALC-SCNC: 11 MMOL/L (ref 0–18)
AST SERPL-CCNC: 28 U/L (ref ?–34)
BASOPHILS # BLD AUTO: 0.06 X10(3) UL (ref 0–0.2)
BASOPHILS NFR BLD AUTO: 0.4 %
BILIRUB SERPL-MCNC: 0.8 MG/DL (ref 0.2–1.1)
BUN BLD-MCNC: 16 MG/DL (ref 9–23)
BUN/CREAT SERPL: 19 (ref 10–20)
CALCIUM BLD-MCNC: 10.1 MG/DL (ref 8.7–10.4)
CHLORIDE SERPL-SCNC: 104 MMOL/L (ref 98–112)
CO2 SERPL-SCNC: 27 MMOL/L (ref 21–32)
CREAT BLD-MCNC: 0.84 MG/DL
DEPRECATED RDW RBC AUTO: 40.4 FL (ref 35.1–46.3)
EGFRCR SERPLBLD CKD-EPI 2021: 69 ML/MIN/1.73M2 (ref 60–?)
EOSINOPHIL # BLD AUTO: 0.02 X10(3) UL (ref 0–0.7)
EOSINOPHIL NFR BLD AUTO: 0.1 %
ERYTHROCYTE [DISTWIDTH] IN BLOOD BY AUTOMATED COUNT: 12.1 % (ref 11–15)
GLOBULIN PLAS-MCNC: 3.1 G/DL (ref 2–3.5)
GLUCOSE BLD-MCNC: 110 MG/DL (ref 70–99)
GLUCOSE BLDC GLUCOMTR-MCNC: 150 MG/DL (ref 70–99)
HCT VFR BLD AUTO: 41.1 %
HGB BLD-MCNC: 13.9 G/DL
IMM GRANULOCYTES # BLD AUTO: 0.1 X10(3) UL (ref 0–1)
IMM GRANULOCYTES NFR BLD: 0.6 %
LYMPHOCYTES # BLD AUTO: 1.15 X10(3) UL (ref 1–4)
LYMPHOCYTES NFR BLD AUTO: 6.9 %
MCH RBC QN AUTO: 30.7 PG (ref 26–34)
MCHC RBC AUTO-ENTMCNC: 33.8 G/DL (ref 31–37)
MCV RBC AUTO: 90.7 FL
MONOCYTES # BLD AUTO: 0.52 X10(3) UL (ref 0.1–1)
MONOCYTES NFR BLD AUTO: 3.1 %
MRSA DNA SPEC QL NAA+PROBE: NEGATIVE
NEUTROPHILS # BLD AUTO: 14.92 X10 (3) UL (ref 1.5–7.7)
NEUTROPHILS # BLD AUTO: 14.92 X10(3) UL (ref 1.5–7.7)
NEUTROPHILS NFR BLD AUTO: 88.9 %
OSMOLALITY SERPL CALC.SUM OF ELEC: 296 MOSM/KG (ref 275–295)
PLATELET # BLD AUTO: 250 10(3)UL (ref 150–450)
POTASSIUM SERPL-SCNC: 3.9 MMOL/L (ref 3.5–5.1)
PROT SERPL-MCNC: 7.8 G/DL (ref 5.7–8.2)
RBC # BLD AUTO: 4.53 X10(6)UL
SODIUM SERPL-SCNC: 142 MMOL/L (ref 136–145)
WBC # BLD AUTO: 16.8 X10(3) UL (ref 4–11)

## 2025-01-31 PROCEDURE — 71045 X-RAY EXAM CHEST 1 VIEW: CPT | Performed by: EMERGENCY MEDICINE

## 2025-01-31 PROCEDURE — 0HQ0XZZ REPAIR SCALP SKIN, EXTERNAL APPROACH: ICD-10-PCS | Performed by: EMERGENCY MEDICINE

## 2025-01-31 PROCEDURE — 70450 CT HEAD/BRAIN W/O DYE: CPT | Performed by: EMERGENCY MEDICINE

## 2025-01-31 PROCEDURE — 73502 X-RAY EXAM HIP UNI 2-3 VIEWS: CPT | Performed by: EMERGENCY MEDICINE

## 2025-01-31 PROCEDURE — 72125 CT NECK SPINE W/O DYE: CPT | Performed by: EMERGENCY MEDICINE

## 2025-01-31 PROCEDURE — 99223 1ST HOSP IP/OBS HIGH 75: CPT | Performed by: HOSPITALIST

## 2025-01-31 RX ORDER — HALOPERIDOL 5 MG/ML
5 INJECTION INTRAMUSCULAR ONCE
Status: DISCONTINUED | OUTPATIENT
Start: 2025-01-31 | End: 2025-01-31

## 2025-01-31 RX ORDER — QUETIAPINE FUMARATE 25 MG/1
50 TABLET, FILM COATED ORAL 2 TIMES DAILY
Status: DISCONTINUED | OUTPATIENT
Start: 2025-01-31 | End: 2025-02-03

## 2025-01-31 RX ORDER — MIRTAZAPINE 7.5 MG/1
7.5 TABLET, FILM COATED ORAL NIGHTLY
Status: DISCONTINUED | OUTPATIENT
Start: 2025-01-31 | End: 2025-02-03

## 2025-01-31 RX ORDER — TEMAZEPAM 15 MG/1
15 CAPSULE ORAL NIGHTLY PRN
Status: DISCONTINUED | OUTPATIENT
Start: 2025-01-31 | End: 2025-02-03

## 2025-01-31 RX ORDER — AMLODIPINE BESYLATE 5 MG/1
5 TABLET ORAL DAILY
Status: DISCONTINUED | OUTPATIENT
Start: 2025-01-31 | End: 2025-02-03

## 2025-01-31 RX ORDER — HYDROMORPHONE HYDROCHLORIDE 1 MG/ML
0.4 INJECTION, SOLUTION INTRAMUSCULAR; INTRAVENOUS; SUBCUTANEOUS EVERY 2 HOUR PRN
Status: DISCONTINUED | OUTPATIENT
Start: 2025-01-31 | End: 2025-02-03

## 2025-01-31 RX ORDER — ONDANSETRON 4 MG/1
4 TABLET, FILM COATED ORAL DAILY PRN
COMMUNITY
Start: 2024-12-25

## 2025-01-31 RX ORDER — HYDROMORPHONE HYDROCHLORIDE 1 MG/ML
0.2 INJECTION, SOLUTION INTRAMUSCULAR; INTRAVENOUS; SUBCUTANEOUS EVERY 2 HOUR PRN
Status: DISCONTINUED | OUTPATIENT
Start: 2025-01-31 | End: 2025-02-03

## 2025-01-31 RX ORDER — LORAZEPAM 0.5 MG/1
0.5 TABLET ORAL 2 TIMES DAILY PRN
Status: DISCONTINUED | OUTPATIENT
Start: 2025-01-31 | End: 2025-02-02

## 2025-01-31 RX ORDER — LORAZEPAM 2 MG/ML
0.5 INJECTION INTRAMUSCULAR ONCE
Status: DISCONTINUED | OUTPATIENT
Start: 2025-01-31 | End: 2025-01-31

## 2025-01-31 RX ORDER — POLYETHYLENE GLYCOL 3350 17 G/17G
17 POWDER, FOR SOLUTION ORAL DAILY PRN
COMMUNITY

## 2025-01-31 RX ORDER — SERTRALINE HYDROCHLORIDE 100 MG/1
1 TABLET, FILM COATED ORAL DAILY
COMMUNITY

## 2025-01-31 RX ORDER — ATORVASTATIN CALCIUM 20 MG/1
20 TABLET, FILM COATED ORAL NIGHTLY
Status: DISCONTINUED | OUTPATIENT
Start: 2025-01-31 | End: 2025-02-03

## 2025-01-31 RX ORDER — PSEUDOEPHEDRINE HCL 30 MG
1 TABLET ORAL DAILY
COMMUNITY

## 2025-01-31 RX ORDER — MIRTAZAPINE 15 MG/1
7.5 TABLET, FILM COATED ORAL NIGHTLY
COMMUNITY
Start: 2023-02-27 | End: 2025-01-31 | Stop reason: CLARIF

## 2025-01-31 RX ORDER — POLYETHYLENE GLYCOL 3350 17 G/17G
17 POWDER, FOR SOLUTION ORAL DAILY PRN
Status: DISCONTINUED | OUTPATIENT
Start: 2025-01-31 | End: 2025-02-03

## 2025-01-31 RX ORDER — MAGNESIUM OXIDE 400 MG/1
400 TABLET ORAL DAILY
Status: DISCONTINUED | OUTPATIENT
Start: 2025-02-01 | End: 2025-02-03

## 2025-01-31 RX ORDER — QUETIAPINE FUMARATE 50 MG/1
50 TABLET, FILM COATED ORAL
COMMUNITY

## 2025-01-31 RX ORDER — HYDROMORPHONE HYDROCHLORIDE 1 MG/ML
0.8 INJECTION, SOLUTION INTRAMUSCULAR; INTRAVENOUS; SUBCUTANEOUS EVERY 2 HOUR PRN
Status: DISCONTINUED | OUTPATIENT
Start: 2025-01-31 | End: 2025-02-03

## 2025-01-31 RX ORDER — ACETAMINOPHEN 500 MG
500 TABLET ORAL EVERY 4 HOURS PRN
Status: DISCONTINUED | OUTPATIENT
Start: 2025-01-31 | End: 2025-02-03

## 2025-01-31 RX ORDER — QUETIAPINE FUMARATE 50 MG/1
50 TABLET, FILM COATED ORAL 2 TIMES DAILY
COMMUNITY

## 2025-01-31 RX ORDER — KETOROLAC TROMETHAMINE 15 MG/ML
15 INJECTION, SOLUTION INTRAMUSCULAR; INTRAVENOUS ONCE
Status: COMPLETED | OUTPATIENT
Start: 2025-01-31 | End: 2025-01-31

## 2025-01-31 RX ORDER — OMEPRAZOLE 20 MG/1
20 CAPSULE, DELAYED RELEASE ORAL EVERY MORNING
COMMUNITY
Start: 2025-01-08

## 2025-01-31 RX ORDER — MIRTAZAPINE 7.5 MG/1
7.5 TABLET, FILM COATED ORAL NIGHTLY
COMMUNITY
Start: 2025-01-27

## 2025-01-31 RX ORDER — LAMOTRIGINE 25 MG/1
2 TABLET ORAL DAILY
COMMUNITY

## 2025-01-31 RX ORDER — LAMOTRIGINE 25 MG/1
50 TABLET ORAL DAILY
Status: DISCONTINUED | OUTPATIENT
Start: 2025-01-31 | End: 2025-02-03

## 2025-01-31 RX ORDER — LORAZEPAM 2 MG/ML
0.5 INJECTION INTRAMUSCULAR ONCE
Status: COMPLETED | OUTPATIENT
Start: 2025-01-31 | End: 2025-01-31

## 2025-01-31 RX ORDER — LORAZEPAM 0.5 MG/1
0.5 TABLET ORAL 2 TIMES DAILY PRN
COMMUNITY
Start: 2023-01-28

## 2025-01-31 RX ORDER — METOPROLOL SUCCINATE 25 MG/1
25 TABLET, EXTENDED RELEASE ORAL DAILY
Status: DISCONTINUED | OUTPATIENT
Start: 2025-02-01 | End: 2025-02-03

## 2025-01-31 RX ORDER — SERTRALINE HYDROCHLORIDE 100 MG/1
100 TABLET, FILM COATED ORAL DAILY
Status: DISCONTINUED | OUTPATIENT
Start: 2025-01-31 | End: 2025-02-03

## 2025-01-31 RX ORDER — DOCUSATE SODIUM 100 MG/1
100 CAPSULE, LIQUID FILLED ORAL DAILY PRN
Status: DISCONTINUED | OUTPATIENT
Start: 2025-01-31 | End: 2025-02-03

## 2025-01-31 RX ORDER — LIDOCAINE HYDROCHLORIDE 10 MG/ML
INJECTION, SOLUTION EPIDURAL; INFILTRATION; INTRACAUDAL; PERINEURAL
Status: COMPLETED
Start: 2025-01-31 | End: 2025-01-31

## 2025-01-31 RX ORDER — LIDOCAINE HYDROCHLORIDE 10 MG/ML
20 INJECTION, SOLUTION EPIDURAL; INFILTRATION; INTRACAUDAL; PERINEURAL ONCE
Status: COMPLETED | OUTPATIENT
Start: 2025-01-31 | End: 2025-01-31

## 2025-01-31 RX ORDER — PANTOPRAZOLE SODIUM 40 MG/1
40 TABLET, DELAYED RELEASE ORAL
Status: DISCONTINUED | OUTPATIENT
Start: 2025-02-01 | End: 2025-02-03

## 2025-01-31 RX ORDER — LORAZEPAM 2 MG/ML
1 INJECTION INTRAMUSCULAR ONCE
Status: COMPLETED | OUTPATIENT
Start: 2025-01-31 | End: 2025-01-31

## 2025-01-31 NOTE — ED INITIAL ASSESSMENT (HPI)
Crystal by [facility] from [facility] for unwitnesed fall. She was seen by staff ~ 30 minutes prior to the fall. Found down on the bathroom floor. Aox1 at baseline.    Onarrival, C-collar and posey wrist restraints used. Bloody bandages to head. Off Eliquis for 6 months per pt's

## 2025-01-31 NOTE — ED QUICK NOTES
Orders for admission, patient is aware of plan and ready to go upstairs. Any questions, please call ED RN Jacy at extension [phone].     Patient Covid vaccination status: Fully vaccinated     COVID Test Ordered in ED: None    COVID Suspicion at Admission: N/A    Running Infusions:  None    Mental Status/LOC at time of transport: Awake, not following commands, limited communication,  at bedside. Alert to self at baseline.    Other pertinent information: Patient found on bathroom floor after unwitnessed fall. Laceration to head stapled. Fractured hip, thompson fall risk. Bilateral wrist restraints.   CIWA score: N/A   NIH score:  N/A

## 2025-01-31 NOTE — ED PROVIDER NOTES
Patient Seen in: [facility] Emergency Department      History     Chief Complaint   Patient presents with    Fall     Stated Complaint:     Subjective:   HPI      84-year-old female with history of hypertension, diabetes, hyperlipidemia, status post pacemaker placement, atrial fibrillation not on anticoagulation, anemia, and advanced dementia presents after a fall.  The patient was found on the floor in her bathroom with significant bleeding from her head.  It is unclear how she fell.  She was last seen approximately 30 to 45 minutes prior to being found on the floor.  She is unable to give any history given her dementia.  History is obtained from the patient's  at the bedside.    Objective:     Past Medical History:    Age related osteoporosis    Anemia    Anxiety disorder    Atrial fibrillation (HCC)    Bipolar affective (HCC)    Cardiac pacemaker    Collagenous colitis    Dementia (HCC)    Dementia with behavioral disturbance (HCC)    Diabetes (HCC)    Epistaxis    Essential hypertension    Gastroesophageal reflux disease with esophagitis    Goiter    Heart disease    Hyperlipidemia    Hypothyroidism, unspecified    Migraines    Osteoarthritis    Plantar fasciitis    Restless leg syndrome    Spinal stenosis              History reviewed. No pertinent surgical history.             Social History     Socioeconomic History    Marital status:    Tobacco Use    Smoking status: Unknown    Smokeless tobacco: Never   Vaping Use    Vaping status: Unknown   Substance and Sexual Activity    Alcohol use: Not Currently    Drug use: Never                  Physical Exam     ED Triage Vitals   BP 01/31/25 1125 123/76   Pulse 01/31/25 1125 86   Resp 01/31/25 1125 24   Temp 01/31/25 1133 96.8 °F (36 °C)   Temp src 01/31/25 1133 Axillary   SpO2 01/31/25 1125 93 %   O2 Device 01/31/25 1125 None (Room air)       Current Vitals:   Vital Signs  BP: (!) 135/108  Pulse: 104  Resp: 20  Temp: 96.8 °F (36 °C)  Temp  src: Axillary  MAP (mmHg): 84    Oxygen Therapy  SpO2: 92 % (RA to 3LNC)  O2 Device: None (Room air)        Physical Exam      General Appearance: alert, no distress  Eyes: pupils equal and round no injection  Respiratory: chest is non tender to palpation, breath sounds are equal  Cardiac: regular rate and rhythm  Gastrointestinal:  soft and non tender, there is no evidence of external or internal trauma by exam.  Neurological: Speech clear.  Moving extremities x 4.  Does not participate with neurologic examination.  Skin: No laceration or abrasions.  Musculoskeletal                Head: 3 centimeter Y shaped laceration noted to the right parietal scalp with a small amount of active oozing.  No bony deformities noted.  No visible or palpable foreign body.                Neck: The patient arrived in a cervical collar. The cervical spine is non-tender and there is no pain with active range of motion                Back: there is no thoracic or lumbar spine or paraspinal tenderness                Tenderness to palpation to the lateral aspect of the right hip and apparent tenderness with flexion at the hip.  No knee, ankle, or foot tenderness noted.  No other extremity tenderness is noted to palpation or range of motion of extremities x 4.  Bilateral dorsalis pedis pulses intact and symmetric..    DIFFERENTIAL DIAGNOSIS: after history and physical exam differential diagnosis was considered for trauma post fall including head injury including concussion, skull fracture, intraparenchymal contusion and subdural hematoma        ED Course     Labs Reviewed   CBC WITH DIFFERENTIAL WITH PLATELET - Abnormal; Notable for the following components:       Result Value    WBC 16.8 (*)     Neutrophil Absolute Prelim 14.92 (*)     Neutrophil Absolute 14.92 (*)     All other components within normal limits   COMP METABOLIC PANEL (14) - Abnormal; Notable for the following components:    Glucose 110 (*)     Calculated Osmolality 296 (*)      All other components within normal limits   URINALYSIS, ROUTINE   RAINBOW DRAW LAVENDER   RAINBOW DRAW LIGHT GREEN   RAINBOW DRAW BLUE   RAINBOW DRAW GOLD   ED/MRSA SCREEN BY PCR-CC                      MDM      Patient extremely agitated upon arrival to the ED.  She was given multiple doses of Ativan but remained agitated.  Able to complete CT of the head and C-spine unable to get the x-ray of the right hip.  CT results noted showing suspected small subdural hematoma.  Communicated with Dr. Kerr, neurosurgery.    Patient was subsequently given Zyprexa and becoming more calm and fell asleep.  Hip x-rays were obtained showing a subcapital fracture of the right hip.  Communicated with Dr. Israel, orthopedics on-call.    Plan to admit to PCU for close monitoring given her subdural hematoma.    Procedure:      Laceration repair:  Verbal consent was obtained from the patient.  The pre-cm laceration on the scalp was anesthetized in the usual fashion. The wound was scrubbed, draped and explored to its base with a gloved finger. There were no deep structures involved. The wound was repaired with staples x 7.  The wound repair was simple.  The procedure was performed by myself.        Admission disposition: 1/31/2025  3:21 PM       I spent a total of 40 minutes of critical care time in obtaining history, performing a physical exam, bedside monitoring of interventions, collecting and interpreting tests and discussion with consultants but not including time spent performing procedures.      Medical Decision Making      Disposition and Plan     Clinical Impression:  1. Acute subdural hematoma (HCC)    2. Laceration of scalp, initial encounter    3. Dementia with agitation, unspecified dementia severity, unspecified dementia type (HCC)    4. Closed subcapital fracture of right femur, initial encounter (HCC)         Disposition:  Admit  1/31/2025  3:21 pm    Follow-up:  No follow-up provider specified.  We recommend that  you schedule follow up care with a primary care provider within the next three months to obtain basic health screening including reassessment of your blood pressure.      Medications Prescribed:  Current Discharge Medication List              Supplementary Documentation:         Hospital Problems       Present on Admission  Date Reviewed: 3/18/2022            ICD-10-CM Noted POA    * (Principal) Acute subdural hematoma (HCC) S06.5XAA 1/31/2025 Unknown

## 2025-02-01 ENCOUNTER — APPOINTMENT (OUTPATIENT)
Dept: CT IMAGING | Facility: HOSPITAL | Age: 85
End: 2025-02-01
Attending: HOSPITALIST
Payer: MEDICARE

## 2025-02-01 LAB
ANION GAP SERPL CALC-SCNC: 9 MMOL/L (ref 0–18)
APTT PPP: 29.9 SECONDS (ref 23–36)
BASOPHILS # BLD AUTO: 0.03 X10(3) UL (ref 0–0.2)
BASOPHILS NFR BLD AUTO: 0.2 %
BUN BLD-MCNC: 22 MG/DL (ref 9–23)
BUN/CREAT SERPL: 27.8 (ref 10–20)
CALCIUM BLD-MCNC: 8.7 MG/DL (ref 8.7–10.4)
CHLORIDE SERPL-SCNC: 104 MMOL/L (ref 98–112)
CO2 SERPL-SCNC: 26 MMOL/L (ref 21–32)
CREAT BLD-MCNC: 0.79 MG/DL
DEPRECATED RDW RBC AUTO: 41.1 FL (ref 35.1–46.3)
EGFRCR SERPLBLD CKD-EPI 2021: 74 ML/MIN/1.73M2 (ref 60–?)
EOSINOPHIL # BLD AUTO: 0 X10(3) UL (ref 0–0.7)
EOSINOPHIL NFR BLD AUTO: 0 %
ERYTHROCYTE [DISTWIDTH] IN BLOOD BY AUTOMATED COUNT: 12.3 % (ref 11–15)
GLUCOSE BLD-MCNC: 164 MG/DL (ref 70–99)
HCT VFR BLD AUTO: 35.5 %
HGB BLD-MCNC: 11.7 G/DL
IMM GRANULOCYTES # BLD AUTO: 0.05 X10(3) UL (ref 0–1)
IMM GRANULOCYTES NFR BLD: 0.4 %
INR BLD: 1.06 (ref 0.8–1.2)
LYMPHOCYTES # BLD AUTO: 0.82 X10(3) UL (ref 1–4)
LYMPHOCYTES NFR BLD AUTO: 6.3 %
MCH RBC QN AUTO: 30 PG (ref 26–34)
MCHC RBC AUTO-ENTMCNC: 33 G/DL (ref 31–37)
MCV RBC AUTO: 91 FL
MONOCYTES # BLD AUTO: 0.61 X10(3) UL (ref 0.1–1)
MONOCYTES NFR BLD AUTO: 4.7 %
NEUTROPHILS # BLD AUTO: 11.47 X10 (3) UL (ref 1.5–7.7)
NEUTROPHILS # BLD AUTO: 11.47 X10(3) UL (ref 1.5–7.7)
NEUTROPHILS NFR BLD AUTO: 88.4 %
OSMOLALITY SERPL CALC.SUM OF ELEC: 295 MOSM/KG (ref 275–295)
PLATELET # BLD AUTO: 190 10(3)UL (ref 150–450)
POTASSIUM SERPL-SCNC: 3.5 MMOL/L (ref 3.5–5.1)
PROTHROMBIN TIME: 14.4 SECONDS (ref 11.6–14.8)
RBC # BLD AUTO: 3.9 X10(6)UL
SODIUM SERPL-SCNC: 139 MMOL/L (ref 136–145)
WBC # BLD AUTO: 13 X10(3) UL (ref 4–11)

## 2025-02-01 PROCEDURE — 99233 SBSQ HOSP IP/OBS HIGH 50: CPT | Performed by: HOSPITALIST

## 2025-02-01 PROCEDURE — 70450 CT HEAD/BRAIN W/O DYE: CPT | Performed by: HOSPITALIST

## 2025-02-01 PROCEDURE — 99223 1ST HOSP IP/OBS HIGH 75: CPT | Performed by: NEUROLOGICAL SURGERY

## 2025-02-01 RX ORDER — HALOPERIDOL 5 MG/ML
INJECTION INTRAMUSCULAR
Status: COMPLETED
Start: 2025-02-01 | End: 2025-02-01

## 2025-02-01 RX ORDER — SODIUM CHLORIDE 9 MG/ML
INJECTION, SOLUTION INTRAVENOUS CONTINUOUS
Status: DISCONTINUED | OUTPATIENT
Start: 2025-02-01 | End: 2025-02-03

## 2025-02-01 RX ORDER — HALOPERIDOL 5 MG/ML
2 INJECTION INTRAMUSCULAR ONCE
Status: COMPLETED | OUTPATIENT
Start: 2025-02-01 | End: 2025-02-01

## 2025-02-01 NOTE — CONSULTS
[facility] [place]  Neurological Surgery Consultation Note    Kalie Kendall Patient Status:  Inpatient    1940 MRN [id number]   Location [facility] 2W/SW Attending Sana Ervin MD   Hosp Day # 1 PCP Candy Shay DO, DO     REASON FOR CONSULTATION:  Right posterior convexity acute subdural hematoma    HISTORY OF PRESENT ILLNESS:  Kalie Kendall is a 84 year old female with advanced dementia who presented after a fall.  Head CT demonstrates a thin right posterior convexity acute subdural hematoma with minimal brain compression.  Repeat head CT today demonstrates no significant change with only mild redistribution.  She also has a femoral neck fracture and orthopedics he is recommending consideration of surgery.  She is not on any antithrombotic medication at this time.    PAST MEDICAL HISTORY:  Past Medical History:    Age related osteoporosis    Anemia    Anxiety disorder    Atrial fibrillation (HCC)    Bipolar affective (HCC)    Cardiac pacemaker    Collagenous colitis    Dementia (HCC)    Dementia with behavioral disturbance (HCC)    Diabetes (HCC)    Epistaxis    Essential hypertension    Gastroesophageal reflux disease with esophagitis    Goiter    Heart disease    Hyperlipidemia    Hypothyroidism, unspecified    Migraines    Osteoarthritis    Plantar fasciitis    Restless leg syndrome    Spinal stenosis       PAST SURGICAL HISTORY:  History reviewed. No pertinent surgical history.    FAMILY HISTORY:  Family history is unknown by patient.    SOCIAL HISTORY:   reports that she has an unknown smoking status. She has never used smokeless tobacco. She reports that she does not currently use alcohol. She reports that she does not use drugs.    ALLERGIES:  Allergies[1]    MEDICATIONS:  Prescriptions Prior to Admission[2]  Current Facility-Administered Medications   Medication Dose Route Frequency    sodium chloride 0.9% infusion   Intravenous Continuous     acetaminophen (Tylenol Extra Strength) tab 500 mg  500 mg Oral Q4H PRN    temazepam (Restoril) cap 15 mg  15 mg Oral Nightly PRN    trimethobenzamide (Tigan) 100 mg/mL injection 200 mg  200 mg Intramuscular Q6H PRN    OLANZapine (Zyprexa) 5 mg in sterile water for injection (PF) IM injection  5 mg Intramuscular Q8H PRN    HYDROmorphone (Dilaudid) 1 MG/ML injection 0.2 mg  0.2 mg Intravenous Q2H PRN    Or    HYDROmorphone (Dilaudid) 1 MG/ML injection 0.4 mg  0.4 mg Intravenous Q2H PRN    Or    HYDROmorphone (Dilaudid) 1 MG/ML injection 0.8 mg  0.8 mg Intravenous Q2H PRN    amLODIPine (Norvasc) tab 5 mg  5 mg Oral Daily    docusate sodium (Colace) cap 100 mg  100 mg Oral Daily PRN    lamoTRIgine (LaMICtal) tab 50 mg  50 mg Oral Daily    LORazepam (Ativan) tab 0.5 mg  0.5 mg Oral BID PRN    magnesium oxide (Mag-Ox) tab 400 mg  400 mg Oral Daily    melatonin cap/tab 10 mg  10 mg Oral QPM    metoprolol succinate ER (Toprol XL) 24 hr tab 25 mg  25 mg Oral Daily    mirtazapine (Remeron) tab 7.5 mg  7.5 mg Oral Nightly    pantoprazole (Protonix) DR tab 40 mg  40 mg Oral QAM AC    polyethylene glycol (PEG 3350) (Miralax) 17 g oral packet 17 g  17 g Oral Daily PRN    QUEtiapine (SEROquel) tab 50 mg  50 mg Oral BID    sertraline (Zoloft) tab 100 mg  100 mg Oral Daily    atorvastatin (Lipitor) tab 20 mg  20 mg Oral Nightly       REVIEW OF SYSTEMS:  A 10-point system was reviewed.  Pertinent positives and negatives are noted in HPI.      PHYSICAL EXAMINATION:  VITAL SIGNS: /62   Pulse 88   Temp 97.5 °F (36.4 °C) (Temporal)   Resp 13   Ht 64\"   Wt 126 lb 1.7 oz (57.2 kg)   SpO2 99%   BMI 21.65 kg/m²   GENERAL:  Patient is a 84 year old female in no acute distress.  HEENT:  Normocephalic, atraumatic  LUNGS: Nonlabored breathing  HEART:  Well perfused  NEUROLOGICAL:    Eyes open intermittently, nonverbal  Spontaneous and purposeful x 4 but not following commands    DIAGNOSTIC DATA:   Lab Results   Component Value Date     WBC 13.0 02/01/2025    HGB 11.7 02/01/2025    HCT 35.5 02/01/2025    .0 02/01/2025    CREATSERUM 0.79 02/01/2025    BUN 22 02/01/2025     02/01/2025    K 3.5 02/01/2025     02/01/2025    CO2 26.0 02/01/2025     02/01/2025    CA 8.7 02/01/2025    PTT 29.9 02/01/2025    INR 1.06 02/01/2025    PTP 14.4 02/01/2025    PGLU 150 01/31/2025       ASSESSMENT:  84-year-old female with a small acute traumatic posterior convexity subdural hematoma with minimal brain compression    I spoke with the patient's  regarding the current clinical situation and plan of care.  We reviewed the imaging findings together.  He explained that given her advanced dementia he and his family are considering transition to comfort measures and do not think that they would like to have any additional follow-up.  I explained that we would plan to repeat a head CT in 2 to 4 weeks and see her in clinic if they choose to pursue maximal medical care.  After this discussion and answering his questions, he expressed understanding and appreciation.    Plan:  - Should they wish to pursue maximal medical care, he should follow-up in [place] [facility] in 2 to 4 weeks in clinic with a repeat head CT.  -We will sign off at this time.  Please call with any questions or concerns.    Brian Kerr MD  Neurological Surgery  [facility]       [1]   Allergies  Allergen Reactions    Prochlorperazine UNKNOWN    Ciprofloxacin RASH    Metoclopramide RASH    Monistat [Miconazole] RASH    Morphine RASH    Serzone [Nefazodone] RASH    Zonegran [Zonisamide] RASH   [2]   Medications Prior to Admission   Medication Sig Dispense Refill Last Dose/Taking    docusate sodium 100 MG Oral Cap Take 1 capsule by mouth daily.   1/31/2025 at  7:48 AM    Melatonin 5 MG Oral Tab Take 2 tablets (10 mg total) by mouth every evening.   1/30/2025 at  6:40 PM    QUEtiapine 50 MG Oral Tab Take 1  tablet (50 mg total) by mouth in the morning and 1 tablet (50 mg total) before bedtime. Takes 50mg at 7am, 12pm, and 4pm.   1/31/2025 at  7:48 AM    Flaxseed, Linseed, (FLAXSEED OIL) 1200 MG Oral Cap Take 2 capsules by mouth daily.   1/31/2025 at  7:48 AM    Acetaminophen 325 MG Oral Cap Take 650 mg by mouth every 6 (six) hours as needed.   9/15/2024    polyethylene glycol, PEG 3350, 17 g Oral Powd Pack Take 17 g by mouth daily as needed.   2/17/2023    sertraline 100 MG Oral Tab Take 1 tablet (100 mg total) by mouth daily.   1/30/2025 at 12:45 PM    QUEtiapine 50 MG Oral Tab Take 1 tablet (50 mg total) by mouth Noon. Takes 50mg at 7am, 12pm, and 4pm   1/30/2025 at 12:00 PM    lamoTRIgine 25 MG Oral Tab Take 2 tablets (50 mg total) by mouth daily.   1/30/2025 at 10:14 AM    diclofenac 1 % External Gel Apply 1 Application topically 4 (four) times daily as needed.   1/24/2025    mirtazapine 7.5 MG Oral Tab Take 1 tablet (7.5 mg total) by mouth nightly.   1/30/2025 at  8:50 PM    LORazepam 0.5 MG Oral Tab Take 1 tablet (0.5 mg total) by mouth 2 (two) times daily as needed for Anxiety.   1/14/2025    ondansetron (ZOFRAN) 4 mg tablet Take 1 tablet (4 mg total) by mouth daily as needed for Nausea.   1/12/2025    omeprazole 20 MG Oral Capsule Delayed Release Take 1 capsule (20 mg total) by mouth every morning.   1/31/2025 at  4:55 AM    magnesium oxide 400 MG Oral Tab Take 1 tablet (400 mg total) by mouth daily. 30 tablet 0 1/31/2025 at  7:48 AM    metoprolol succinate ER 25 MG Oral Tablet 24 Hr Take 1 tablet (25 mg total) by mouth daily. 30 tablet 0 1/31/2025 at  7:48 AM    potassium chloride 20 MEQ Oral Tab CR Take 1 tablet (20 mEq total) by mouth 2 (two) times daily. 60 tablet 0 1/31/2025 at  7:48 AM    Vitamin D3, Cholecalciferol, 25 MCG (1000 UT) Oral Tab Take 1 tablet (1,000 Units total) by mouth daily. 30 tablet 0 1/31/2025 at  7:48 AM    amLODIPine 5 MG Oral Tab Take 1 tablet (5 mg total) by mouth daily. 30 tablet  0 1/31/2025 at  7:48 AM    cyanocobalamine 1000 MCG Oral Tab Take 1 tablet (1,000 mcg total) by mouth daily. 30 tablet 0 1/31/2025 at  7:48 AM    simvastatin 40 MG Oral Tab Take 1 tablet (40 mg total) by mouth nightly. 30 tablet 0 1/30/2025 at  5:29 PM

## 2025-02-01 NOTE — PROGRESS NOTES
Critical Care    I spoke to family at bedside throughout the day.   This afternoon spoke to son Lino and his wife.  Lino spoke again to his father.  They have chosen no surgery or procedures and would like to meet with Hospice.    Kalie resides at [facility].  Family would like to meed with [facility] which they know of through [facility].    I spoke to Kalie Ibrahim RN at [facility]. [phone]. I consulted Social Work to help set up Hospice at discharge.    Will follow up in AM.  D/w Diann SALCIDO.  Message to Dr. Aziza Acevedo NP  Critical Care    Addendum:  Family now requesting [facility], they are scheduled to meet [place] 0930.    Kaelyn Acevedo NP  Critical Care

## 2025-02-01 NOTE — PLAN OF CARE
Patient has poor PO intake due to severe confusion. IV fluids ordered    Problem: Diabetes/Glucose Control  Goal: Glucose maintained within prescribed range  Description: INTERVENTIONS:  - Monitor Blood Glucose as ordered  - Assess for signs and symptoms of hyperglycemia and hypoglycemia  - Administer ordered medications to maintain glucose within target range  - Assess barriers to adequate nutritional intake and initiate nutrition consult as needed  - Instruct patient on self management of diabetes  Outcome: Progressing     Problem: Patient/Family Goals  Goal: Patient/Family Short Term Goal  Description: Patient's Short Term Goal: Decrease pain, get some rest    Interventions:   - PRN pain medication  - See additional Care Plan goals for specific interventions  Outcome: Progressing     Problem: SKIN/TISSUE INTEGRITY - ADULT  Goal: Incision(s), wounds(s) or drain site(s) healing without S/S of infection  Description: INTERVENTIONS:  - Assess and document risk factors for pressure ulcer development  - Assess and document skin integrity  - Assess and document dressing/incision, wound bed, drain sites and surrounding tissue  - Implement wound care per orders  - Initiate isolation precautions as appropriate  - Initiate Pressure Ulcer prevention bundle as indicated  Outcome: Progressing  Goal: Oral mucous membranes remain intact  Description: INTERVENTIONS  - Assess oral mucosa and hygiene practices  - Implement preventative oral hygiene regimen  - Implement oral medicated treatments as ordered  Outcome: Progressing     Problem: HEMATOLOGIC - ADULT  Goal: Maintains hematologic stability  Description: INTERVENTIONS  - Assess for signs and symptoms of bleeding or hemorrhage  - Monitor labs and vital signs for trends  - Administer supportive blood products/factors, fluids and medications as ordered and appropriate  - Administer supportive blood products/factors as ordered and appropriate  Outcome: Progressing  Goal: Free  from bleeding injury  Description: (Example usage: patient with low platelets)  INTERVENTIONS:  - Avoid intramuscular injections, enemas and rectal medication administration  - Ensure safe mobilization of patient  - Hold pressure on venipuncture sites to achieve adequate hemostasis  - Assess for signs and symptoms of internal bleeding  - Monitor lab trends  - Patient is to report abnormal signs of bleeding to staff  - Avoid use of toothpicks and dental floss  - Use electric shaver for shaving  - Use soft bristle tooth brush  - Limit straining and forceful nose blowing  Outcome: Progressing     Problem: NEUROLOGICAL - ADULT  Goal: Absence of seizures  Description: INTERVENTIONS  - Monitor for seizure activity  - Administer anti-seizure medications as ordered  - Monitor neurological status  Outcome: Progressing  Goal: Remains free of injury related to seizure activity  Description: INTERVENTIONS:  - Maintain airway, patient safety  and administer oxygen as ordered  - Monitor patient for seizure activity, document and report duration and description of seizure to MD/LIP  - If seizure occurs, turn patient to side and suction secretions as needed  - Reorient patient post seizure  - Seizure pads on all 4 side rails  - Instruct patient/family to notify RN of any seizure activity  - Instruct patient/family to call for assistance with activity based on assessment  Outcome: Progressing     Problem: PAIN - ADULT  Goal: Verbalizes/displays adequate comfort level or patient's stated pain goal  Description: INTERVENTIONS:  - Encourage pt to monitor pain and request assistance  - Assess pain using appropriate pain scale  - Administer analgesics based on type and severity of pain and evaluate response  - Implement non-pharmacological measures as appropriate and evaluate response  - Consider cultural and social influences on pain and pain management  - Manage/alleviate anxiety  - Utilize distraction and/or relaxation techniques  -  Monitor for opioid side effects  - Notify MD/LIP if interventions unsuccessful or patient reports new pain  - Anticipate increased pain with activity and pre-medicate as appropriate  Outcome: Progressing

## 2025-02-01 NOTE — H&P
[facility]    PATIENT'S NAME: MAUREEN DOUGLAS   ATTENDING PHYSICIAN: Josef Quinonez MD   PATIENT ACCOUNT#:   [id number]    LOCATION:  [address] [place] [address]  MEDICAL RECORD #:   [id number]       YOB: 1940  ADMISSION DATE:       01/31/2025    HISTORY AND PHYSICAL EXAMINATION    CHIEF COMPLAINT:  Mechanical fall, right parietal subdural hematoma, and right femur neck fracture.    HISTORY OF PRESENT ILLNESS:  The patient is an 84-year-old  female with advanced dementia.  Was brought in today from memory care unit after she was found lying on the floor in the bathroom.  Unwitnessed fall with scalp laceration.  In the emergency room, CBC showed white blood cell count of 16.8 with left shift.  Chemistry was unremarkable.  Liver function test normal.  CT scan of the cervical spine showed degenerative joint disease but no acute fracture.  CT scan of the brain showed small right subdural hematoma overlying the right parietal high convexity without significant mass effect or midline shift.  The patient required 7 staples for scalp laceration.  X-ray of the pelvis showed right femur subcapital fracture, acute.  Chest x-ray still pending.  Urinalysis still pending.  The patient will be admitted to progressive care unit for further management.    PAST MEDICAL HISTORY:  Advanced dementia.  Chronic paroxysmal atrial fibrillation, was taken off anticoagulation 6 months ago for high risk of falling.  She had a third degree heart block, status post dual chamber pacemaker.  Hypothyroidism, hypertension, hyperlipidemia, bipolar affective disorder, gastroesophageal reflux disease, prediabetic state, collagenous colitis.    PAST SURGICAL HISTORY:  Unable to obtain from the patient.    MEDICATIONS:  Please see medication reconciliation list.     ALLERGIES:  Ciprofloxacin, prochlorperazine, Reglan, morphine, and Zonegran.    FAMILY HISTORY:  Unable to obtain.    SOCIAL HISTORY:  No tobacco, alcohol, or drug use.   Lives in a memory care unit.  Requires some assistance in her basic activities of daily living.     REVIEW OF SYSTEMS:  Difficult to obtain from the patient.  The patient was found lying on the floor in the bathroom.  She has advanced dementia, quite agitated in the emergency room, required multiple dosages of Ativan and one dose of Zyprexa.  Other 12-point review of systems unobtainable.      PHYSICAL EXAMINATION:    GENERAL:  Quite agitated initially and then she became sedated after receiving Zyprexa.    VITAL SIGNS:  Temperature 96.8, pulse 104, respiratory rate 20, blood pressure 135/100, pulse ox 92% on room air.  HEENT:  Oropharynx clear.  Dry mucous membranes.  Ears and nose normal.  Eyes:  Anicteric sclerae.  NECK:  Supple.  No lymphadenopathy.    LUNGS:  Clear to auscultation bilaterally.  Normal respiratory effort.    HEART:  Regular rate and rhythm.  S1 and S2 auscultated.  No murmur.  ABDOMEN:  Soft, nondistended.  No tenderness.  Positive bowel sounds.  EXTREMITIES:  Right lower extremity shortened compared to the left.  Tenderness, right groin and right lateral hip area.    NEUROLOGIC:  Unable to assess, but no clear focal findings.  Scalp hematoma noted, post staples.    ASSESSMENT:    1.   Mechanical fall with head injury and scalp laceration with right parietal small subdural hematoma.  2.   Right femur subcapital fracture.  3.   History of paroxysmal atrial fibrillation.  4.   Hypertension.  5.   Advanced dementia.    PLAN:  The patient will be admitted to progressive care unit.  Neurosurgery consult.  Orthopedic Surgery consult.  Zyprexa IM p.r.n.  Pain control, will use small doses of IV Dilaudid if needed.  Soft restraints were placed in the emergency room.  Monitor her clinical status closely.  Overall very poor prognosis.  Repeat CT scan in the morning.  Neuro checks.  Follow up on urinalysis and chest x-ray.  Further recommendations to follow.      Dictated By Josef Quinonez  MD  d: 01/31/2025 15:24:36  t: 01/31/2025 16:15:30  [facility] [id number]  FB/

## 2025-02-01 NOTE — CONSULTS
[facility]  part of [facility]    Cardiology Consultation    Kalie Kendall Patient Status:  Inpatient    1940 MRN [id number]   Location [facility] 2W/SW Attending Sana Ervin MD   Hosp Day # 1 PCP Candy Shay DO, DO     Date of Admission:  2025  Date of Consult:  2025  Reason for Consultation: perioperative cardiac risk stratification    History of Present Illness:   Patient is a 84 year old female with sig PMH/o paroxysmal AF - not on AC due to fall risk/frailty, SSS with history of syncope s/p PPM at EOL 24, advanced dementia who presented to ED from SNF due to unwitnessed fall. In the ED, found to have right parietal subdural hematoma and right femur neck fracture.   Of note, patient was seen by EP in May 2024, at that time plan was for pacemaker generator replacement - however this was never scheduled by patient.   Tele with SR, rates 70-110s.   Currently patient is minimally responsive, has been receiving IV hydromorphone for agitation/pain.  Family currently discussing goals of care.  Assessment and Plan:   Fall complicated by SDH and right femur fracture  -unclear etiology for fall, poor historian 2/t advanced dementia  -Orthopedic planning surgery, possible hemiarthroplasty    H/o SSS with syncope s/p PPM  -Per chart review, patient had PPM placed >10 years ago for SSS associated with syncope  -PPM is currently at EOL on 24, planned gen change however did not follow-up  -On review of telemetry since admission, patient has been in sinus rhythm with rates 70-110s, no signs of pathological AV block or excessive pauses  -Remains hemodynamically stable  -Obtain ECG  -Obtain TTE  -Continue telemetry monitoring    Perioperative cardiac risk stratification  -no active s/sx of ACS and no evidence of AVB or arrhythmia since admission, patient does not appear to be pacer dependent  -can proceed with cardiac standpoint with close hemodynamic and tele  monitoring, consider pacing pads perioperatively    Goals of care discussion pending    Past Medical History  Past Medical History:    Age related osteoporosis    Anemia    Anxiety disorder    Atrial fibrillation (HCC)    Bipolar affective (HCC)    Cardiac pacemaker    Collagenous colitis    Dementia (HCC)    Dementia with behavioral disturbance (HCC)    Diabetes (HCC)    Epistaxis    Essential hypertension    Gastroesophageal reflux disease with esophagitis    Goiter    Heart disease    Hyperlipidemia    Hypothyroidism, unspecified    Migraines    Osteoarthritis    Plantar fasciitis    Restless leg syndrome    Spinal stenosis       Past Surgical History  History reviewed. No pertinent surgical history.    Family History  Family History   Family history unknown: Yes       Social History  Pediatric History   Patient Parents    Not on file     Other Topics Concern    Not on file   Social History Narrative    Not on file           Current Medications:  Current Facility-Administered Medications   Medication Dose Route Frequency    sodium chloride 0.9% infusion   Intravenous Continuous    acetaminophen (Tylenol Extra Strength) tab 500 mg  500 mg Oral Q4H PRN    temazepam (Restoril) cap 15 mg  15 mg Oral Nightly PRN    trimethobenzamide (Tigan) 100 mg/mL injection 200 mg  200 mg Intramuscular Q6H PRN    OLANZapine (Zyprexa) 5 mg in sterile water for injection (PF) IM injection  5 mg Intramuscular Q8H PRN    HYDROmorphone (Dilaudid) 1 MG/ML injection 0.2 mg  0.2 mg Intravenous Q2H PRN    Or    HYDROmorphone (Dilaudid) 1 MG/ML injection 0.4 mg  0.4 mg Intravenous Q2H PRN    Or    HYDROmorphone (Dilaudid) 1 MG/ML injection 0.8 mg  0.8 mg Intravenous Q2H PRN    amLODIPine (Norvasc) tab 5 mg  5 mg Oral Daily    docusate sodium (Colace) cap 100 mg  100 mg Oral Daily PRN    lamoTRIgine (LaMICtal) tab 50 mg  50 mg Oral Daily    LORazepam (Ativan) tab 0.5 mg  0.5 mg Oral BID PRN    magnesium oxide (Mag-Ox) tab 400 mg  400 mg Oral  Daily    melatonin cap/tab 10 mg  10 mg Oral QPM    metoprolol succinate ER (Toprol XL) 24 hr tab 25 mg  25 mg Oral Daily    mirtazapine (Remeron) tab 7.5 mg  7.5 mg Oral Nightly    pantoprazole (Protonix) DR tab 40 mg  40 mg Oral QAM AC    polyethylene glycol (PEG 3350) (Miralax) 17 g oral packet 17 g  17 g Oral Daily PRN    QUEtiapine (SEROquel) tab 50 mg  50 mg Oral BID    sertraline (Zoloft) tab 100 mg  100 mg Oral Daily    atorvastatin (Lipitor) tab 20 mg  20 mg Oral Nightly     Medications Prior to Admission   Medication Sig    docusate sodium 100 MG Oral Cap Take 1 capsule by mouth daily.    Melatonin 5 MG Oral Tab Take 2 tablets (10 mg total) by mouth every evening.    QUEtiapine 50 MG Oral Tab Take 1 tablet (50 mg total) by mouth in the morning and 1 tablet (50 mg total) before bedtime. Takes 50mg at 7am, 12pm, and 4pm.    Flaxseed, Linseed, (FLAXSEED OIL) 1200 MG Oral Cap Take 2 capsules by mouth daily.    Acetaminophen 325 MG Oral Cap Take 650 mg by mouth every 6 (six) hours as needed.    polyethylene glycol, PEG 3350, 17 g Oral Powd Pack Take 17 g by mouth daily as needed.    sertraline 100 MG Oral Tab Take 1 tablet (100 mg total) by mouth daily.    QUEtiapine 50 MG Oral Tab Take 1 tablet (50 mg total) by mouth Noon. Takes 50mg at 7am, 12pm, and 4pm    lamoTRIgine 25 MG Oral Tab Take 2 tablets (50 mg total) by mouth daily.    diclofenac 1 % External Gel Apply 1 Application topically 4 (four) times daily as needed.    mirtazapine 7.5 MG Oral Tab Take 1 tablet (7.5 mg total) by mouth nightly.    LORazepam 0.5 MG Oral Tab Take 1 tablet (0.5 mg total) by mouth 2 (two) times daily as needed for Anxiety.    ondansetron (ZOFRAN) 4 mg tablet Take 1 tablet (4 mg total) by mouth daily as needed for Nausea.    omeprazole 20 MG Oral Capsule Delayed Release Take 1 capsule (20 mg total) by mouth every morning.    magnesium oxide 400 MG Oral Tab Take 1 tablet (400 mg total) by mouth daily.    metoprolol succinate ER 25  MG Oral Tablet 24 Hr Take 1 tablet (25 mg total) by mouth daily.    potassium chloride 20 MEQ Oral Tab CR Take 1 tablet (20 mEq total) by mouth 2 (two) times daily.    Vitamin D3, Cholecalciferol, 25 MCG (1000 UT) Oral Tab Take 1 tablet (1,000 Units total) by mouth daily.    amLODIPine 5 MG Oral Tab Take 1 tablet (5 mg total) by mouth daily.    cyanocobalamine 1000 MCG Oral Tab Take 1 tablet (1,000 mcg total) by mouth daily.    simvastatin 40 MG Oral Tab Take 1 tablet (40 mg total) by mouth nightly.       Allergies  Allergies[1]    Review of Systems:   ROS  10 point review of systems completed and negative except as noted.    Physical Exam:   Patient Vitals for the past 24 hrs:   BP Temp Temp src Pulse Resp SpO2 Weight   02/01/25 1100 115/62 -- -- 88 13 99 % --   02/01/25 1000 115/59 -- -- 88 10 98 % --   02/01/25 0900 111/53 -- -- 80 14 98 % --   02/01/25 0800 129/65 -- -- 80 19 100 % --   02/01/25 0700 116/58 -- -- 80 15 100 % --   02/01/25 0600 116/64 -- -- 84 19 99 % --   02/01/25 0500 95/51 -- -- 83 (!) 31 99 % --   02/01/25 0400 104/77 97.5 °F (36.4 °C) Temporal 104 23 95 % --   02/01/25 0300 120/67 -- -- 96 22 99 % --   02/01/25 0200 118/67 -- -- 94 20 99 % --   02/01/25 0100 114/66 -- -- 95 21 98 % --   02/01/25 0000 125/65 97.2 °F (36.2 °C) Temporal 95 (!) 30 98 % --   01/31/25 2300 121/66 -- -- 95 19 98 % --   01/31/25 2200 124/70 -- -- 104 20 95 % --   01/31/25 2100 152/77 -- -- 90 20 96 % --   01/31/25 2000 153/80 -- -- 91 22 100 % --   01/31/25 1901 108/85 98.1 °F (36.7 °C) Temporal 94 17 96 % 126 lb 1.7 oz (57.2 kg)   01/31/25 1830 (!) 146/100 -- -- 90 21 99 % --   01/31/25 1730 (!) 137/91 -- -- 117 21 95 % --   01/31/25 1700 136/88 -- -- 103 17 96 % --   01/31/25 1445 -- -- -- 104 20 92 % --   01/31/25 1430 (!) 135/108 -- -- 112 22 94 % --   01/31/25 1400 (!) 130/104 -- -- 114 (!) 28 94 % --       Intake/Output:   Last 3 shifts:   Intake/Output                   01/30/25 0700 - 01/31/25 0659 (Not  Admitted) 01/31/25 0700 - 02/01/25 0659 02/01/25 0700 - 02/02/25 0659       Intake    P.O.  --  50  --    P.O. -- 50 --    Total Intake -- 50 --       Output    Urine  --  200  --    Urine -- 200 --    Urine Occurrence -- 1 x --    Incontinent Urine Occurrence -- 1 x --    Output (mL) (External Urinary Catheter) -- 0 --    Total Output -- 200 --       Net I/O     -- -150 --          Vent Settings:    Hemodynamic parameters (last 24 hours):    Scheduled Meds:    amLODIPine  5 mg Oral Daily    lamoTRIgine  50 mg Oral Daily    magnesium oxide  400 mg Oral Daily    melatonin  10 mg Oral QPM    metoprolol succinate ER  25 mg Oral Daily    mirtazapine  7.5 mg Oral Nightly    pantoprazole  40 mg Oral QAM AC    QUEtiapine  50 mg Oral BID    sertraline  100 mg Oral Daily    atorvastatin  20 mg Oral Nightly     Continuous Infusions:    sodium chloride 50 mL/hr at 02/01/25 0533       Physical Exam:  General: lethargic on exam, minimally resopnsive  HEENT:anicteric sclera, neck supple, no thyromegaly or adenopathy.  Neck: No JVD, carotids 2+, no bruits.  Cardiac: Regular rate and rhythm. S1, S2 normal.   Lungs: Clear without wheezes,  Abdomen: Soft, non-tender. BS-present.  Extremities: No edema.    Neurologic: Unable to obtain  Skin: Warm and dry.     Results:   Laboratory Data:  Lab Results   Component Value Date    WBC 13.0 (H) 02/01/2025    HGB 11.7 (L) 02/01/2025    HCT 35.5 02/01/2025    .0 02/01/2025    CREATSERUM 0.79 02/01/2025    BUN 22 02/01/2025     02/01/2025    K 3.5 02/01/2025     02/01/2025    CO2 26.0 02/01/2025     (H) 02/01/2025    CA 8.7 02/01/2025    ALB 4.7 01/31/2025    ALKPHO 125 01/31/2025    TP 7.8 01/31/2025    AST 28 01/31/2025    ALT 17 01/31/2025    PTT 29.9 02/01/2025    INR 1.06 02/01/2025    PTP 14.4 02/01/2025    TSH 1.420 03/14/2022    ETOH <3 03/12/2022         Recent Labs   Lab 01/31/25  1134 02/01/25  0346   * 164*   BUN 16 22   CREATSERUM 0.84 0.79   CA  10.1 8.7    139   K 3.9 3.5    104   CO2 27.0 26.0     Recent Labs   Lab 01/31/25  1134 02/01/25  0346   RBC 4.53 3.90   HGB 13.9 11.7*   HCT 41.1 35.5   MCV 90.7 91.0   MCH 30.7 30.0   MCHC 33.8 33.0   RDW 12.1 12.3   NEPRELIM 14.92* 11.47*   WBC 16.8* 13.0*   .0 190.0       No results for input(s): \"BNPML\" in the last 168 hours.    No results for input(s): \"TROP\", \"CK\" in the last 168 hours.    Imaging:  CT BRAIN OR HEAD (CPT=70450)    Result Date: 2/1/2025  CONCLUSION:  1. Small acute 4 mm thickness right posterior cerebral convexity subdural hematoma that minimally extends along the right posterior falx.  This appears unchanged since previous day exam. 2. Small volume acute subarachnoid hemorrhage in the left sylvian fissure is either new or better visualized. 3. No mass effect or midline shift. 4. Soft tissue injury overlying the right lateral parietal calvarium with associated cutaneous staples. 5. Lesser incidental findings as above.   elm-remote  Dictated by (CST): Augustus Blandon MD on 2/01/2025 at 11:12 AM     Finalized by (CST): Augustus Blandon MD on 2/01/2025 at 11:17 AM          XR CHEST AP PORTABLE  (CPT=71045)    Result Date: 1/31/2025  CONCLUSION:   No acute cardiopulmonary abnormality.    Dictated by (CST): Octavio Washington MD on 1/31/2025 at 3:33 PM     Finalized by (CST): Octavio Washington MD on 1/31/2025 at 3:34 PM          XR HIP W OR WO PELVIS 2 OR 3 VIEWS, RIGHT (CPT=73502)    Result Date: 1/31/2025  CONCLUSION:   Acute, comminuted, moderately angulated, mildly impacted fracture of the proximal right femur.  Mild bilateral hip osteoarthritis.      Dictated by (CST): Agueda Somers MD on 1/31/2025 at 3:01 PM     Finalized by (CST): Agueda Somers MD on 1/31/2025 at 3:02 PM          CT SPINE CERVICAL (CPT=72125)    Result Date: 1/31/2025  CONCLUSION:  1. Significant image degradation secondary to patient related motion artifact.  When accounting for this limitation, there is no  gross acute fracture or traumatic subluxation of the cervical spine. 2. Mild-to-moderate multilevel cervical spine degenerative changes. 3. Dextroscoliosis of the cervical spine with stable trace degenerative anterolisthesis of C4 relative to C5. 4. Low-attenuation right thyroid nodule is probably unchanged since cervical spine CT from 2021.    elm-remote  Dictated by (CST): Augustus Blandon MD on 1/31/2025 at 1:47 PM     Finalized by (CST): Augustus Blandon MD on 1/31/2025 at 1:51 PM          CT BRAIN OR HEAD (CPT=70450)    Result Date: 1/31/2025  CONCLUSION:  Severely motion-degraded study. Within these parameters: 1. Suspected small right subdural hematoma overlying the right parietal high convexity without significant mass effect or shift of midline structures. This could alternatively represent artifact. Short-term follow-up imaging is advised.  2. Right parietal scalp injury.  3. Senescent changes of parenchymal volume loss with sequela of chronic microvascular ischemic disease.  4. There is large vessel atherosclerosis involving the anterior and posterior circulations.  5. Lesser incidental findings as above.      Results of this examination were discussed with the patient's physician, Dr. Bermudez, by Dr. Nice at 1342 on 01/31/2025.   Dictated by (CST): Freddy Nice MD on 1/31/2025 at 1:40 PM     Finalized by (CST): Freddy Nice MD on 1/31/2025 at 1:45 PM           Thank you for allowing me to participate in the care of your patient.    Errol Lara, DO  [facility]         [1]   Allergies  Allergen Reactions    Prochlorperazine UNKNOWN    Ciprofloxacin RASH    Metoclopramide RASH    Monistat [Miconazole] RASH    Morphine RASH    Serzone [Nefazodone] RASH    Zonegran [Zonisamide] RASH

## 2025-02-01 NOTE — HISTORICAL OFFICE NOTE
[facility]  Outside Information  Continuity of Care Document  5/20/2024  Kalie Kendall - 84 y.o. Female; born [birth year]Summary of episode note, generated on Jan. 31, 2025January 31, 2025   CHIEF COMPLAINT    CHIEF COMPLAINT  Reason for Visit/Chief Complaint   Consult for eval and mgt of bradycardia.  Referred by Dr Jay.   The pt is a 83 year old accompanied by family due to her advanced dementia. She has SSS and history of syncope, and thus had a pacemaker implanted in 2010 at another institution. She has no recurrent syncope. She was placed on Eliquis for an unknown reason.     PROBLEMS  Reconcile with Patient's ChartPROBLEMS  Problem Effective Dates Date resolved Problem Status   Sick sinus syndrome - SSS, [SNOMED-CT: 17063111] 5/21/2024 - Active   Recurrent syncope, [SNOMED-CT: 327215723] 5/21/2024 - Active   Dementia, senile, [SNOMED-CT: 97664048] 5/21/2024 - Active   Cardiac pacemaker (s/p PPM), [SNOMED-CT: 592096957] 5/10/2024 - Active     ENCOUNTER DIAGNOSIS    ENCOUNTER DIAGNOSIS  Problem Effective Dates Date resolved Problem Status   Pacemaker battery depletion, [SNOMED-CT: 029750563] 5/30/2024 - Active   Sick sinus syndrome - SSS, [SNOMED-CT: 18753020] 5/21/2024 - Active   Recurrent syncope, [SNOMED-CT: 407829068] 5/21/2024 - Active   Cardiac pacemaker (s/p PPM), [SNOMED-CT: 500902371] 5/10/2024 - Active     VITAL SIGNS    VITAL SIGNS  Date / Time: 5/21/2024   BP Systolic 110 mmHg   BP Diastolic 62 mmHg   Height 64 inches   Weight 130 lbs   Pulse Rate 68 bpm   BSA (Body Surface Area) 1.6 cc/m2   BMI (Body Mass Index) 22.3 cc/m2   Blood Pressure 110 / 62 mmHg     PHYSICAL EXAMINATION    PHYSICAL EXAMINATION  Header Details   Constitutional 92%o2   Vitals Right Arm Sitting  / 62 mmHg, Pulse rate 68 bpm, Height in 5' 4\", BMI: 22.3, Weight in 130.07 lbs (or) 59 kgs, BSA : 1.64 cc/m²   General Appearance No Acute Distress, Well groomed    Head/Eyes/Ears/Nose/Mouth/Throat Sclera Clear, Mucous membranes Moist   Neck Normal carotid pulsations, No carotid bruits, No JVD   Respiratory Unlabored, Lungs clear with normal breath sounds, Equal bilaterally   Cardiovascular    Gait Abnormal gait   Upper Extremities No clubbing, No cyanosis, No edema   Skin Warm and dry   Neurologic / Psychiatric Non-focal, Disoriented   Speech Normal speech     ALLERGIES, ADVERSE REACTIONS, ALERTS  Reconcile with Patient's ChartALLERGIES, ADVERSE REACTIONS, ALERTS  Type Substance Reaction Severity Status   Allergies ciprofloxacin \"Ingredient (IN)\" [RxNorm:2551], [SNOMED: 2551] Rash [Snomed:522947243] Severe Active   Allergies metoclopramide \"Ingredient (IN)\" [RxNorm:6915], [SNOMED: 6915] Rash [Snomed:157261450] Severe Active   Allergies Monistat Soothing Care \"Brand Name (BN)\" [RxNorm:9598995], [SNOMED: 7230025] Rash [Snomed:964961137] Severe Active   Allergies Opioids - Morphine Analogues [Snomed:685336268], [SNOMED: 390011509] Rash [Snomed:416576069] Severe Active   Allergies prochlorperazine \"Ingredient (IN)\" [RxNorm:8704], [SNOMED: 8704] Unknown Severe Active   Allergies Serzone \"Brand Name (BN)\" [RxNorm:534373], [SNOMED: 399778] Rash [Snomed:227991586] Severe Active   Allergies Zonegran \"Brand Name (BN)\" [RxNorm:724986], [SNOMED: 103150] Rash [Snomed:815850242] Severe Active     MEDICATIONS ADMINISTERED DURING VISIT    No data available    MEDICATIONS    MEDICATIONS  Medication Start Date Route/Frequency Status   amLODIPine 5 mg tablet, [RxNorm: 769054] 5/9/2024 Take 1 tablet orally once a day. Active   bisacodyL 10 mg rectal suppository, [RxNorm: 005658] 5/9/2024 Insert 1 suppository per rectum once a day as needed. Active   cyanocobalamin (vit B-12) 1,000 mcg tablet, [RxNorm: 747511] 5/9/2024 Take 1 tablet orally once a day. Active   donepeziL 5 mg tablet, [RxNorm: 796871] 5/9/2024 Take 1 tablet orally once a day. Active   Eliquis 5 mg tablet, [RxNorm: 6949263]  5/9/2024 Take 1 tablet orally 2 times a day. Active   magnesium 400 mg (as magnesium oxide) capsule, [RxNorm: 716183] 5/9/2024 Take 1 capsule orally once a day. Active   metoprolol succinate ER 25 mg tablet,extended release 24 hr, [RxNorm: 601979] 5/9/2024 Take 1 tablet orally once a day. Active   QUEtiapine 50 mg tablet, [RxNorm: 921487] 5/9/2024 Take 1 tablet orally once a day at night. Active   simvastatin 40 mg tablet, [RxNorm: 766634] 5/9/2024 Take 1 tablet orally once a day. Active     ASSESSMENT    Sinus node dysfunction  -Device interrogation reviewed:  Dual-chamber SJM/Abbott pacemaker  Battery status: LIUDMILA 6/2022, EOL 6/26/24  Atrial lead nose in 2021.  15 AMS w/ noise, no AF  AP13%,  &lt;1%  Underlying SR  -No indication for anticoagulation. Stop Eliquis now.  -Office will provide dates for pacemaker generator replacement surgery. Goals, alternatives, and risks of the procedure were reviewed in detail with family. All questions were answered. She will needs premed in holding area for anxiety and behavioral changes with dementia.     FAMILY HISTORY    FAMILY HISTORY  Relationship Age Diagnosis   Father 0 Cardiac disease     GENERAL STATUS    No data available    PAST MEDICAL HISTORY    PAST MEDICAL HISTORY  Problem Date diagonsed Date resolved Status   Sick sinus syndrome - SSS, [SNOMED-CT: 06079154] 5/21/2024 - Active   Recurrent syncope, [SNOMED-CT: 085765977] 5/21/2024 - Active   Dementia, senile, [SNOMED-CT: 61353911] 5/21/2024 - Active     HISTORY OF PRESENT ILLNESS    The pt is a 83 year old accompanied by family due to her advanced dementia. She has SSS and history of syncope, and thus had a pacemaker implanted in 2010 at another institution. She has no recurrent syncope. She was placed on Eliquis for an unknown reason.       REVIEW OF SYSTEMS  Header Details   Constitutional No history of Weight Loss, Fevers, Chills, Anorexia, Fatigue, Malaise   Eyes No history of Blurry vision, Visual changes,  Double vision, Discharge, Eye pain, Dry eyes, Decreased vision   ENT No history of Sorethroat, Tinnitus, Bloody nose (epistaxis), Hearing loss, Sinusitis, Gingival bleeding, Pain with swallowing   Hem/Lymphatic No history of Easy bruising, Bleeding diathesis, Blood clots, Swollen glands, Lymphedema, Hx of blood transfusion, Anemia, Bleeding problems     SOCIAL HISTORY    SOCIAL HISTORY  Social History Element Description Effective Dates   Smoking status Never smoked -        Organization   [facility]  [phone] (Work)  [address], [address]  [place] [address]  [place] [address]       Legal Authenticator    Gustabo Wallace

## 2025-02-01 NOTE — ED QUICK NOTES
Report given to PCCU LOLY Adames. Patient transported to room 204 on 3L NC and monitor with RN and PCT. Family present. All patient belongings included in transport.

## 2025-02-01 NOTE — CONSULTS
[facility]  part of [facility]    Report of Consultation    Kalie Kendall Patient Status:  Inpatient    1940 MRN [id number]   Location [facility] 2W/SW Attending Sana Ervin MD   Hosp Day # 1 PCP Candy Shay DO, DO     Date of Admission:  2025  Date of Consult:  2025  Reason for Consultation:   Right femoral neck fracture    History of Present Illness:   Patient is a 84 year old female who was admitted to the hospital for Acute subdural hematoma (HCC):  She has severe dementia but does ambulate at baseline per family, present at bedside.   Currently resting comfortably, asleep, history provided by family   states pacemaker not working, needed to be fixed previously. Recently was not cleared for surgery at [facility]  Yesterday had mechanical fall, brought in by EMS, found to have right femoral neck fracture and subdural hematoma    Past Medical History  Past Medical History:    Age related osteoporosis    Anemia    Anxiety disorder    Atrial fibrillation (HCC)    Bipolar affective (HCC)    Cardiac pacemaker    Collagenous colitis    Dementia (HCC)    Dementia with behavioral disturbance (HCC)    Diabetes (HCC)    Epistaxis    Essential hypertension    Gastroesophageal reflux disease with esophagitis    Goiter    Heart disease    Hyperlipidemia    Hypothyroidism, unspecified    Migraines    Osteoarthritis    Plantar fasciitis    Restless leg syndrome    Spinal stenosis       Past Surgical History  History reviewed. No pertinent surgical history.    Family History  Family History   Family history unknown: Yes       Social History  Social History     Socioeconomic History    Marital status:    Tobacco Use    Smoking status: Unknown    Smokeless tobacco: Never   Vaping Use    Vaping status: Unknown   Substance and Sexual Activity    Alcohol use: Not Currently    Drug use: Never     Social Drivers of Health     Food Insecurity: Unknown (2025)    Food  Insecurity     Food Insecurity: Patient unable to answer   Transportation Needs: Unknown (1/31/2025)    Transportation Needs     Lack of Transportation: Patient unable to answer   Housing Stability: Unknown (1/31/2025)    Housing Stability     Housing Instability: Patient unable to answer           Current Medications:  Current Facility-Administered Medications   Medication Dose Route Frequency    sodium chloride 0.9% infusion   Intravenous Continuous    acetaminophen (Tylenol Extra Strength) tab 500 mg  500 mg Oral Q4H PRN    temazepam (Restoril) cap 15 mg  15 mg Oral Nightly PRN    trimethobenzamide (Tigan) 100 mg/mL injection 200 mg  200 mg Intramuscular Q6H PRN    OLANZapine (Zyprexa) 5 mg in sterile water for injection (PF) IM injection  5 mg Intramuscular Q8H PRN    HYDROmorphone (Dilaudid) 1 MG/ML injection 0.2 mg  0.2 mg Intravenous Q2H PRN    Or    HYDROmorphone (Dilaudid) 1 MG/ML injection 0.4 mg  0.4 mg Intravenous Q2H PRN    Or    HYDROmorphone (Dilaudid) 1 MG/ML injection 0.8 mg  0.8 mg Intravenous Q2H PRN    amLODIPine (Norvasc) tab 5 mg  5 mg Oral Daily    docusate sodium (Colace) cap 100 mg  100 mg Oral Daily PRN    lamoTRIgine (LaMICtal) tab 50 mg  50 mg Oral Daily    LORazepam (Ativan) tab 0.5 mg  0.5 mg Oral BID PRN    magnesium oxide (Mag-Ox) tab 400 mg  400 mg Oral Daily    melatonin cap/tab 10 mg  10 mg Oral QPM    metoprolol succinate ER (Toprol XL) 24 hr tab 25 mg  25 mg Oral Daily    mirtazapine (Remeron) tab 7.5 mg  7.5 mg Oral Nightly    pantoprazole (Protonix) DR tab 40 mg  40 mg Oral QAM AC    polyethylene glycol (PEG 3350) (Miralax) 17 g oral packet 17 g  17 g Oral Daily PRN    QUEtiapine (SEROquel) tab 50 mg  50 mg Oral BID    sertraline (Zoloft) tab 100 mg  100 mg Oral Daily    atorvastatin (Lipitor) tab 20 mg  20 mg Oral Nightly     Medications Prior to Admission   Medication Sig    docusate sodium 100 MG Oral Cap Take 1 capsule by mouth daily.    Melatonin 5 MG Oral Tab Take 2  tablets (10 mg total) by mouth every evening.    QUEtiapine 50 MG Oral Tab Take 1 tablet (50 mg total) by mouth in the morning and 1 tablet (50 mg total) before bedtime. Takes 50mg at 7am, 12pm, and 4pm.    Flaxseed, Linseed, (FLAXSEED OIL) 1200 MG Oral Cap Take 2 capsules by mouth daily.    Acetaminophen 325 MG Oral Cap Take 650 mg by mouth every 6 (six) hours as needed.    polyethylene glycol, PEG 3350, 17 g Oral Powd Pack Take 17 g by mouth daily as needed.    sertraline 100 MG Oral Tab Take 1 tablet (100 mg total) by mouth daily.    QUEtiapine 50 MG Oral Tab Take 1 tablet (50 mg total) by mouth Noon. Takes 50mg at 7am, 12pm, and 4pm    lamoTRIgine 25 MG Oral Tab Take 2 tablets (50 mg total) by mouth daily.    diclofenac 1 % External Gel Apply 1 Application topically 4 (four) times daily as needed.    mirtazapine 7.5 MG Oral Tab Take 1 tablet (7.5 mg total) by mouth nightly.    LORazepam 0.5 MG Oral Tab Take 1 tablet (0.5 mg total) by mouth 2 (two) times daily as needed for Anxiety.    ondansetron (ZOFRAN) 4 mg tablet Take 1 tablet (4 mg total) by mouth daily as needed for Nausea.    omeprazole 20 MG Oral Capsule Delayed Release Take 1 capsule (20 mg total) by mouth every morning.    magnesium oxide 400 MG Oral Tab Take 1 tablet (400 mg total) by mouth daily.    metoprolol succinate ER 25 MG Oral Tablet 24 Hr Take 1 tablet (25 mg total) by mouth daily.    potassium chloride 20 MEQ Oral Tab CR Take 1 tablet (20 mEq total) by mouth 2 (two) times daily.    Vitamin D3, Cholecalciferol, 25 MCG (1000 UT) Oral Tab Take 1 tablet (1,000 Units total) by mouth daily.    amLODIPine 5 MG Oral Tab Take 1 tablet (5 mg total) by mouth daily.    cyanocobalamine 1000 MCG Oral Tab Take 1 tablet (1,000 mcg total) by mouth daily.    simvastatin 40 MG Oral Tab Take 1 tablet (40 mg total) by mouth nightly.       Allergies  Allergies[1]    Review of Systems:   Pertinent items are noted in HPI.    Physical Exam:   Vital Signs:  Blood  pressure 116/58, pulse 80, temperature 97.5 °F (36.4 °C), temperature source Temporal, resp. rate 15, height 5' 4\" (1.626 m), weight 126 lb 1.7 oz (57.2 kg), SpO2 100%.     General: No acute distress.  Respiratory: Clear to auscultation bilaterally.  No wheezes. No rhonchi.  Musculoskeletal: Right hip shortened, externally rotated, Unable to assess neuromotor status due to mental state    Results:     Laboratory Data:  Lab Results   Component Value Date    WBC 13.0 (H) 02/01/2025    HGB 11.7 (L) 02/01/2025    HCT 35.5 02/01/2025    .0 02/01/2025    CREATSERUM 0.79 02/01/2025    BUN 22 02/01/2025     02/01/2025    K 3.5 02/01/2025     02/01/2025    CO2 26.0 02/01/2025     (H) 02/01/2025    CA 8.7 02/01/2025    ALB 4.7 01/31/2025    ALKPHO 125 01/31/2025    TP 7.8 01/31/2025    AST 28 01/31/2025    ALT 17 01/31/2025    TSH 1.420 03/14/2022    ETOH <3 03/12/2022         Imaging:  XR CHEST AP PORTABLE  (CPT=71045)    Result Date: 1/31/2025  CONCLUSION:   No acute cardiopulmonary abnormality.    Dictated by (CST): Octavio Washington MD on 1/31/2025 at 3:33 PM     Finalized by (CST): Octavio Washington MD on 1/31/2025 at 3:34 PM          XR HIP W OR WO PELVIS 2 OR 3 VIEWS, RIGHT (CPT=73502)    Result Date: 1/31/2025  CONCLUSION:   Acute, comminuted, moderately angulated, mildly impacted fracture of the proximal right femur.  Mild bilateral hip osteoarthritis.      Dictated by (CST): Agueda Somers MD on 1/31/2025 at 3:01 PM     Finalized by (CST): Agueda Somers MD on 1/31/2025 at 3:02 PM          CT SPINE CERVICAL (CPT=72125)    Result Date: 1/31/2025  CONCLUSION:  1. Significant image degradation secondary to patient related motion artifact.  When accounting for this limitation, there is no gross acute fracture or traumatic subluxation of the cervical spine. 2. Mild-to-moderate multilevel cervical spine degenerative changes. 3. Dextroscoliosis of the cervical spine with stable trace degenerative  anterolisthesis of C4 relative to C5. 4. Low-attenuation right thyroid nodule is probably unchanged since cervical spine CT from 2021.    elm-remote  Dictated by (CST): Augustus Blandon MD on 1/31/2025 at 1:47 PM     Finalized by (CST): Augustus Blandon MD on 1/31/2025 at 1:51 PM          CT BRAIN OR HEAD (CPT=70450)    Result Date: 1/31/2025  CONCLUSION:  Severely motion-degraded study. Within these parameters: 1. Suspected small right subdural hematoma overlying the right parietal high convexity without significant mass effect or shift of midline structures. This could alternatively represent artifact. Short-term follow-up imaging is advised.  2. Right parietal scalp injury.  3. Senescent changes of parenchymal volume loss with sequela of chronic microvascular ischemic disease.  4. There is large vessel atherosclerosis involving the anterior and posterior circulations.  5. Lesser incidental findings as above.      Results of this examination were discussed with the patient's physician, Dr. Bermudez, by Dr. Nice at 1342 on 01/31/2025.   Dictated by (CST): Freddy Nice MD on 1/31/2025 at 1:40 PM     Finalized by (CST): Freddy Nice MD on 1/31/2025 at 1:45 PM              Impression:        Recommendations:  I had a discussion with family as well as anesthesia. Per anesthesia today she is extremely high risk for surgery and they are unable to accept that risk (due to her AV block and currently non-functioning pacemaker). I explained to family that I would recommend hemiarthroplasty if only for pain control and palliation. Cardiology consult is pending, can determine whether pacemaker can be fixed.     I explained to family recommendation for surgery, and that I am available if and when she is cleared for hemiarthroplasty. All questions answered.     Thank you for allowing me to participate in the care of your patient.    Enoc Doherty MD  2/1/2025         [1]   Allergies  Allergen Reactions     Prochlorperazine UNKNOWN    Ciprofloxacin RASH    Metoclopramide RASH    Monistat [Miconazole] RASH    Morphine RASH    Serzone [Nefazodone] RASH    Zonegran [Zonisamide] RASH

## 2025-02-01 NOTE — PROGRESS NOTES
[employer] pacemaker interregation performed. Per Kaelyn ALMAZAN spoke to [employer] for assistance during process. Confirmed that pacemaker battery life is at zero.

## 2025-02-01 NOTE — PROGRESS NOTES
Patient admitted from the ED, comes from the memory care unit at [facility]. Patient observed extremely agitated at the beginning of the shift, thrashing around in the bed, unable to redirect. Patient's family was at the bedside and also unable to redirect the patient. Bilateral wrist restraints are in place. PRN pain medication administered, effective. Patient was able to get some rest, right hip maintained in a better alignment after the pain medication. Right parietal scalp laceration observed with 7 staples, small amount of sanguinous drainage observed on the dressing, dressing changed. Also observed with generalized bruising and abrasions.

## 2025-02-01 NOTE — PROGRESS NOTES
Critical Care    I spoke to , son and daughter in law at bedside.   Kalie has advanced demential and lives in Memory Care.      She has a [employer] pacemaker.  Dr. Wallace recommended pacemaker generator change May 2024.    declined any procedure and has not had interrogation since then.    Currently SR w/ rare atrial pacing.  Will interrogate pacemaker for battery diagnostics now.    I spoke to Dr. Chas Snyder.  Cardiology to see for preop eval.      Family will await pacemaker and Cardiology evaluation them make decision regarding surgery.  They may very well request no procedures and are agreeable to meet with Hospice.    Updated Diann RN and Renee GONGORA NP.    Kaelyn Acevedo NP  Critical Care    Addendum:  [employer] pacemaker interrogated and confirmed w/ rep device is at end of service NO BATTERY left.    Kaelyn Acevedo NP  Critical Care

## 2025-02-01 NOTE — PROGRESS NOTES
[facility]  part of [facility]    Progress Note    Kalie Kendall Patient Status:  Inpatient    1940 MRN [id number]   Location [facility] 2W/SW Attending Sana Ervin MD   Hosp Day # 1 PCP Candy Shay DO,      Chief Complaint:     Acute subdural hematoma    Subjective:   Subjective:    Patient seen and examined agitated confused   at bedside  Unable to communicate and obtain ROS from the patient due to clinical condition    Objective:   Blood pressure 115/62, pulse 88, temperature 97.5 °F (36.4 °C), temperature source Temporal, resp. rate 13, height 5' 4\" (1.626 m), weight 126 lb 1.7 oz (57.2 kg), SpO2 99%.  Physical Exam    General: Patient is agitated confused not responsive to verbal stimuli   HEENT:atraumatic normocephalic  Cardiac: S1-S2 appreciated  Lungs: Good air entry bilaterally clear to auscultation  Abdomen: Soft nontender nondistended positive bowel sounds      Results:   Lab Results   Component Value Date    WBC 13.0 (H) 2025    HGB 11.7 (L) 2025    HCT 35.5 2025    .0 2025    CREATSERUM 0.79 2025    BUN 22 2025     2025    K 3.5 2025     2025    CO2 26.0 2025     (H) 2025    CA 8.7 2025    ALB 4.7 2025    ALKPHO 125 2025    BILT 0.8 2025    TP 7.8 2025    AST 28 2025    ALT 17 2025    PTT 29.9 2025    INR 1.06 2025    TSH 1.420 2022    ETOH <3 2022       CT BRAIN OR HEAD (CPT=70450)    Result Date: 2025  CONCLUSION:  1. Small acute 4 mm thickness right posterior cerebral convexity subdural hematoma that minimally extends along the right posterior falx.  This appears unchanged since previous day exam. 2. Small volume acute subarachnoid hemorrhage in the left sylvian fissure is either new or better visualized. 3. No mass effect or midline shift. 4. Soft tissue injury overlying the right  lateral parietal calvarium with associated cutaneous staples. 5. Lesser incidental findings as above.   elm-remote  Dictated by (CST): Augustus Blandon MD on 2/01/2025 at 11:12 AM     Finalized by (CST): Augustus Blandon MD on 2/01/2025 at 11:17 AM          XR CHEST AP PORTABLE  (CPT=71045)    Result Date: 1/31/2025  CONCLUSION:   No acute cardiopulmonary abnormality.    Dictated by (CST): Octavio Washington MD on 1/31/2025 at 3:33 PM     Finalized by (CST): Octavio Washington MD on 1/31/2025 at 3:34 PM          XR HIP W OR WO PELVIS 2 OR 3 VIEWS, RIGHT (CPT=73502)    Result Date: 1/31/2025  CONCLUSION:   Acute, comminuted, moderately angulated, mildly impacted fracture of the proximal right femur.  Mild bilateral hip osteoarthritis.      Dictated by (CST): Agueda Somers MD on 1/31/2025 at 3:01 PM     Finalized by (CST): Agueda Somers MD on 1/31/2025 at 3:02 PM          CT SPINE CERVICAL (CPT=72125)    Result Date: 1/31/2025  CONCLUSION:  1. Significant image degradation secondary to patient related motion artifact.  When accounting for this limitation, there is no gross acute fracture or traumatic subluxation of the cervical spine. 2. Mild-to-moderate multilevel cervical spine degenerative changes. 3. Dextroscoliosis of the cervical spine with stable trace degenerative anterolisthesis of C4 relative to C5. 4. Low-attenuation right thyroid nodule is probably unchanged since cervical spine CT from 2021.    elm-remote  Dictated by (CST): Augustus Blandon MD on 1/31/2025 at 1:47 PM     Finalized by (CST): Augustus Blandon MD on 1/31/2025 at 1:51 PM          CT BRAIN OR HEAD (CPT=70450)    Result Date: 1/31/2025  CONCLUSION:  Severely motion-degraded study. Within these parameters: 1. Suspected small right subdural hematoma overlying the right parietal high convexity without significant mass effect or shift of midline structures. This could alternatively represent artifact. Short-term follow-up imaging is advised.  2. Right  parietal scalp injury.  3. Senescent changes of parenchymal volume loss with sequela of chronic microvascular ischemic disease.  4. There is large vessel atherosclerosis involving the anterior and posterior circulations.  5. Lesser incidental findings as above.      Results of this examination were discussed with the patient's physician, Dr. Bermudez, by Dr. Nice at 1342 on 01/31/2025.   Dictated by (CST): Freddy Nice MD on 1/31/2025 at 1:40 PM     Finalized by (CST): Freddy Nice MD on 1/31/2025 at 1:45 PM               Assessment & Plan:     Mechanical fall with head injury and scalp laceration with right parietal small subdural hematoma.  -Imaging reviewed consistent with small subdural hematoma on the right parietal side  -Appreciate neurology and neurosurgery recommendations  -Discussed with patient's  once more comfort care at this time    Right femur subcapital fracture.  -Continue pain control  -Appreciate Ortho surgery recommendations  -Patient high risk for surgery at this time  -I recommend hemiarthroplasty for pain control and palliation  - continue IV analgesics impressively  - does not want surgery at this time     History of paroxysmal atrial fibrillation.  -Cardiology is on board appreciate mentations  -Patient has a nonfunctioning pacemaker  -History of AV block    Hypertension.  Advanced dementia.    Goals of care discussion:  at this time would like to go with more comfort measures.  And despite looking into hospice.  Will administer Haldol IV 2 mg at this time for agitation continue aggressive IV analgesics.  Appreciate recommendations from neurosurgery orthopedic surgery as well as cardiology  Prognosis is guarded we will continue monitor closely    Greater than 55 minutes spent with coordination and discussion with family    **Certification      PHYSICIAN Certification of Need for Inpatient Hospitalization - Initial Certification    Patient will require inpatient  services that will reasonably be expected to span two midnight's based on the clinical documentation in H+P.   Based on patients current state of illness, I anticipate that, after discharge, patient will require TBD.      Sana Ervin MD  2/1/2025

## 2025-02-02 PROCEDURE — 99233 SBSQ HOSP IP/OBS HIGH 50: CPT | Performed by: HOSPITALIST

## 2025-02-02 RX ORDER — LORAZEPAM 2 MG/ML
0.5 INJECTION INTRAMUSCULAR EVERY 4 HOURS PRN
Status: DISCONTINUED | OUTPATIENT
Start: 2025-02-02 | End: 2025-02-03

## 2025-02-02 RX ORDER — HALOPERIDOL 5 MG/ML
2 INJECTION INTRAMUSCULAR EVERY 6 HOURS PRN
Status: DISCONTINUED | OUTPATIENT
Start: 2025-02-02 | End: 2025-02-03

## 2025-02-02 NOTE — PROGRESS NOTES
Critical Care    [facility] met with family.  Plan to discharge tomorrow w/ Home Hospice.   Continue IV Dilaudid, I will change Lorazepam to IV route.      Reviewed w/ Vicky SALCIDO I will update Dr. Ervin.    Kaelyn Acevedo NP

## 2025-02-02 NOTE — PLAN OF CARE
Safety restraints in place; high risk lines / tubing protected.     Dov MORALES RN    Problem: Safety Risk - Non-Violent Restraints  Goal: Patient will remain free from self-harm  Description: INTERVENTIONS:  - Apply the least restrictive restraint to prevent harm  - Notify patient and family of reasons restraints applied  - Assess for any contributing factors to confusion (electrolyte disturbances, delirium, medications)  - Discontinue any unnecessary medical devices as soon as possible  - Assess the patient's physical comfort, circulation, skin condition, hydration, nutrition and elimination needs   - Reorient and redirection as needed  - Assess for the need to continue restraints  Outcome: Not Progressing

## 2025-02-02 NOTE — CM/SW NOTE
02/02/25 0900   CM/SW Referral Data   Referral Source Physician   Reason for Referral Discharge planning   Informant Clinical Staff Member;EMR   Medical Hx   Does patient have an established PCP? Yes  (Candy Shay)   Patient Info   Patient's Current Mental Status at Time of Assessment Confused or unable to complete assessment   Patient's Home Environment Memory Care Facility   Post Acute Care Provider Upon Admission   ([facility])   Patient Status Prior to Admission   Independent with ADLs and Mobility No   Pt. requires assistance with Housework;Driving;Meals;Bathing;Ambulating;Dressing;Medications;Toileting;Finances   Services in place prior to admission DME/Supplies at home   Type of DME/Supplies Standard Walker;Wheelchair;Raised Toilet Seat;Shower Chair;Grab Bars   Discharge Needs   Anticipated D/C needs Hospice     Pt discussed during nursing rounds. Dx SDH, right femur fracture, dementia, needs new pacemaker. From [facility] for memory care. Pt's spouse lives at same facility in the [place] side of [facility]. Per ICU APN, family is requesting hospice at dc. Pt is able to dc back to memory care with hospice care. Hospice referral sent to [facility] per family's request. Provider scheduled to meet w/pt's family in room at 9:30am. CM will follow up provider later this morning.    1035: On license of [facility] accepted in [place], provider reserved,    Plan: [facility] pending medical clearance.    / to remain available for support and/or discharge planning.     FRANCISCO GerardoN    [phone]

## 2025-02-02 NOTE — CM/SW NOTE
02/02/25 1152   Discharge disposition   Expected discharge disposition Home or Self   Post Acute Care Provider   ([facility])   Additional Home Care/Hospice Provider   ([facility])   Discharge transportation [facility]     Pt will return to [facility] unit w/[facility]. Liajulian Torres from [facility] confirmed consents have been signed and new POLST completed. Melissa confirmed pt may dc tomorrow after 12pm. Melissa also requesting that transportation be coordinated by . [facility] scheduled for 1pm  on Monday 2/3. PCS updated.    Plan: [facility] with [facility] on Monday 2/3.    / to remain available for support and/or discharge planning.     KRISTEN Gerardo    [phone]

## 2025-02-02 NOTE — PROGRESS NOTES
[facility]                                                            PROGRESS NOTE      Patient Name: Kalie Kendall MRN: [id number]   : 1940 CSN: [id number]       Assessment and Plan:     Fall complicated by SDH and right femur fracture  -Unclear etiology for fall  -Plan for hospice care     H/o SSS with syncope s/p PPM  -PPM is currently at EOL on 24  -Patient has been in sinus rhythm with rates 70-110s, no signs of pathological AV block or excessive pauses  -Remains hemodynamically stable  -Discussed with pt's family, and plans of care have been for comfort and have decided not to pursue pacemaker generator replacement surgery.  -No further gregorio/mgt indicated currently    Gustabo Wallace MD  [facility]  2025  L3    History:   The patient is not able to communicate, discussed with family at bedside.  No new events.    Medications:      amLODIPine  5 mg Oral Daily    lamoTRIgine  50 mg Oral Daily    magnesium oxide  400 mg Oral Daily    melatonin  10 mg Oral QPM    metoprolol succinate ER  25 mg Oral Daily    mirtazapine  7.5 mg Oral Nightly    pantoprazole  40 mg Oral QAM AC    QUEtiapine  50 mg Oral BID    sertraline  100 mg Oral Daily    atorvastatin  20 mg Oral Nightly      sodium chloride 50 mL/hr at 25 0023       Objective:   Blood pressure 107/47, pulse 95, temperature 98.1 °F (36.7 °C), temperature source Temporal, resp. rate 10, height 5' 4\" (1.626 m), weight 131 lb 13.4 oz (59.8 kg), SpO2 97%.  GEN - no distress  HEENT - normal conjunctiva, moist mucosa  Neck - supple, no LAD  Lungs - nonlabored, clear bilaterally  Heart - JVP 14 cm H2O, carotids normal; RRR, nl S1/S2; no edema  Abd - soft, nondistended  Ext - arthritic changes  Neuro - unresponsive to verbal stimuli    Results:     Lab Results   Component Value Date    WBC 13.0 (H) 2025    HGB 11.7 (L) 2025     HCT 35.5 02/01/2025    .0 02/01/2025    CREATSERUM 0.79 02/01/2025    BUN 22 02/01/2025     02/01/2025    K 3.5 02/01/2025     02/01/2025    CO2 26.0 02/01/2025     (H) 02/01/2025    CA 8.7 02/01/2025    ALB 4.7 01/31/2025    ALKPHO 125 01/31/2025    BILT 0.8 01/31/2025    TP 7.8 01/31/2025    AST 28 01/31/2025    ALT 17 01/31/2025    PTT 29.9 02/01/2025    INR 1.06 02/01/2025    TSH 1.420 03/14/2022    ETOH <3 03/12/2022       Imaging: I independently visualized all relevant chest imaging in PACS, agree with radiology interpretation except where noted.    CT BRAIN OR HEAD (CPT=70450)    Result Date: 2/1/2025  CONCLUSION:  1. Small acute 4 mm thickness right posterior cerebral convexity subdural hematoma that minimally extends along the right posterior falx.  This appears unchanged since previous day exam. 2. Small volume acute subarachnoid hemorrhage in the left sylvian fissure is either new or better visualized. 3. No mass effect or midline shift. 4. Soft tissue injury overlying the right lateral parietal calvarium with associated cutaneous staples. 5. Lesser incidental findings as above.   elm-remote  Dictated by (CST): Augustus Blandon MD on 2/01/2025 at 11:12 AM     Finalized by (CST): Augustus Blandon MD on 2/01/2025 at 11:17 AM          XR CHEST AP PORTABLE  (CPT=71045)    Result Date: 1/31/2025  CONCLUSION:   No acute cardiopulmonary abnormality.    Dictated by (CST): Octavio Washington MD on 1/31/2025 at 3:33 PM     Finalized by (CST): Octavio Washington MD on 1/31/2025 at 3:34 PM          XR HIP W OR WO PELVIS 2 OR 3 VIEWS, RIGHT (CPT=73502)    Result Date: 1/31/2025  CONCLUSION:   Acute, comminuted, moderately angulated, mildly impacted fracture of the proximal right femur.  Mild bilateral hip osteoarthritis.      Dictated by (CST): Agueda Somers MD on 1/31/2025 at 3:01 PM     Finalized by (CST): Agueda Somers MD on 1/31/2025 at 3:02 PM          CT SPINE CERVICAL (CPT=72125)    Result  Date: 1/31/2025  CONCLUSION:  1. Significant image degradation secondary to patient related motion artifact.  When accounting for this limitation, there is no gross acute fracture or traumatic subluxation of the cervical spine. 2. Mild-to-moderate multilevel cervical spine degenerative changes. 3. Dextroscoliosis of the cervical spine with stable trace degenerative anterolisthesis of C4 relative to C5. 4. Low-attenuation right thyroid nodule is probably unchanged since cervical spine CT from 2021.    elm-remote  Dictated by (CST): Augustus Blandon MD on 1/31/2025 at 1:47 PM     Finalized by (CST): Augustus Blandon MD on 1/31/2025 at 1:51 PM          CT BRAIN OR HEAD (CPT=70450)    Result Date: 1/31/2025  CONCLUSION:  Severely motion-degraded study. Within these parameters: 1. Suspected small right subdural hematoma overlying the right parietal high convexity without significant mass effect or shift of midline structures. This could alternatively represent artifact. Short-term follow-up imaging is advised.  2. Right parietal scalp injury.  3. Senescent changes of parenchymal volume loss with sequela of chronic microvascular ischemic disease.  4. There is large vessel atherosclerosis involving the anterior and posterior circulations.  5. Lesser incidental findings as above.      Results of this examination were discussed with the patient's physician, Dr. Bermudez, by Dr. Nice at 1342 on 01/31/2025.   Dictated by (CST): Freddy Nice MD on 1/31/2025 at 1:40 PM     Finalized by (CST): Freddy Nice MD on 1/31/2025 at 1:45 PM

## 2025-02-02 NOTE — PLAN OF CARE
Problem: Patient Centered Care  Goal: Patient preferences are identified and integrated in the patient's plan of care  Description: Interventions:  - What would you like us to know as we care for you? Priority Comfort per pt's  at bedside.  Problem: NEUROLOGICAL - ADULT  Goal: Achieves stable or improved neurological status  Description: INTERVENTIONS  - Assess for and report changes in neurological status  - Initiate measures to prevent increased intracranial pressure  - Maintain blood pressure and fluid volume within ordered parameters to optimize cerebral perfusion and minimize risk of hemorrhage  - Monitor temperature, glucose, and sodium. Initiate appropriate interventions as ordered  Outcome: Progressing     Problem: PAIN - ADULT  Goal: Verbalizes/displays adequate comfort level or patient's stated pain goal  Description: INTERVENTIONS:  - Encourage pt to monitor pain and request assistance  - Assess pain using appropriate pain scale  - Administer analgesics based on type and severity of pain and evaluate response  - Implement non-pharmacological measures as appropriate and evaluate response  - Consider cultural and social influences on pain and pain management  - Manage/alleviate anxiety  - Utilize distraction and/or relaxation techniques  - Monitor for opioid side effects  - Notify MD/LIP if interventions unsuccessful or patient reports new pain  - Anticipate increased pain with activity and pre-medicate as appropriate  Outcome: Progressing     - Provide timely, complete, and accurate information to patient/family  - Incorporate patient and family knowledge, values, beliefs, and cultural backgrounds into the planning and delivery of care  - Encourage patient/family to participate in care and decision-making at the level they choose  - Honor patient and family perspectives and choices  Outcome: Progressing

## 2025-02-03 VITALS
SYSTOLIC BLOOD PRESSURE: 128 MMHG | RESPIRATION RATE: 12 BRPM | HEART RATE: 99 BPM | HEIGHT: 64 IN | DIASTOLIC BLOOD PRESSURE: 50 MMHG | WEIGHT: 131.63 LBS | BODY MASS INDEX: 22.47 KG/M2 | TEMPERATURE: 98 F | OXYGEN SATURATION: 98 %

## 2025-02-03 PROCEDURE — 99239 HOSP IP/OBS DSCHRG MGMT >30: CPT | Performed by: HOSPITALIST

## 2025-02-03 RX ORDER — HYDROMORPHONE HYDROCHLORIDE 2 MG/1
2 TABLET ORAL EVERY 4 HOURS PRN
Qty: 30 TABLET | Refills: 0 | Status: SHIPPED | OUTPATIENT
Start: 2025-02-03 | End: 2025-02-03

## 2025-02-03 RX ORDER — HYDROMORPHONE HYDROCHLORIDE 2 MG/1
1 TABLET ORAL EVERY 4 HOURS PRN
Qty: 30 TABLET | Refills: 0 | Status: SHIPPED | OUTPATIENT
Start: 2025-02-03 | End: 2025-02-03

## 2025-02-03 RX ORDER — HYDROMORPHONE HYDROCHLORIDE 2 MG/1
1 TABLET ORAL EVERY 4 HOURS PRN
Status: DISCONTINUED | OUTPATIENT
Start: 2025-02-03 | End: 2025-02-03

## 2025-02-03 RX ORDER — FENTANYL 50 UG/1
1 PATCH TRANSDERMAL
Qty: 1 PATCH | Refills: 0 | Status: SHIPPED | OUTPATIENT
Start: 2025-02-03

## 2025-02-03 NOTE — PLAN OF CARE
Report given to RN at [facility]. Vitals as charted. Family updated on plan of care. Per [facility] keep PIV for transport.  updated that patient is in transit     Problem: Patient Centered Care  Goal: Patient preferences are identified and integrated in the patient's plan of care  Description: Interventions:  - What would you like us to know as we care for you?   - Provide timely, complete, and accurate information to patient/family  - Incorporate patient and family knowledge, values, beliefs, and cultural backgrounds into the planning and delivery of care  - Encourage patient/family to participate in care and decision-making at the level they choose  - Honor patient and family perspectives and choices  Outcome: Adequate for Discharge     Problem: Diabetes/Glucose Control  Goal: Glucose maintained within prescribed range  Description: INTERVENTIONS:  - Monitor Blood Glucose as ordered  - Assess for signs and symptoms of hyperglycemia and hypoglycemia  - Administer ordered medications to maintain glucose within target range  - Assess barriers to adequate nutritional intake and initiate nutrition consult as needed  - Instruct patient on self management of diabetes  Outcome: Adequate for Discharge     Problem: Patient/Family Goals  Goal: Patient/Family Long Term Goal  Description: Patient's Long Term Goal: Return to Memory care facility    Interventions:  - See additional Care Plan goals for specific interventions  Outcome: Adequate for Discharge  Goal: Patient/Family Short Term Goal  Description: Patient's Short Term Goal: Decrease pain, get some rest    Interventions:   - PRN pain medication  - See additional Care Plan goals for specific interventions  Outcome: Adequate for Discharge     Problem: SKIN/TISSUE INTEGRITY - ADULT  Goal: Skin integrity remains intact  Description: INTERVENTIONS  - Assess and document risk factors for pressure ulcer development  - Assess and document skin  integrity  - Monitor for areas of redness and/or skin breakdown  - Initiate interventions, skin care algorithm/standards of care as needed  Outcome: Adequate for Discharge  Goal: Incision(s), wounds(s) or drain site(s) healing without S/S of infection  Description: INTERVENTIONS:  - Assess and document risk factors for pressure ulcer development  - Assess and document skin integrity  - Assess and document dressing/incision, wound bed, drain sites and surrounding tissue  - Implement wound care per orders  - Initiate isolation precautions as appropriate  - Initiate Pressure Ulcer prevention bundle as indicated  Outcome: Adequate for Discharge  Goal: Oral mucous membranes remain intact  Description: INTERVENTIONS  - Assess oral mucosa and hygiene practices  - Implement preventative oral hygiene regimen  - Implement oral medicated treatments as ordered  Outcome: Adequate for Discharge

## 2025-02-03 NOTE — DISCHARGE SUMMARY
[facility]  part of [facility]     Discharge Summary    Kalie Kendall Patient Status:  Inpatient    1940 MRN [id number]   Location [facility] 2W/SW Attending Sana Ervin MD   Hosp Day # 3 PCP Candy Shay DO, DO     Date of Admission: 2025    Date of Discharge: 2025    Admitting Diagnosis: Acute subdural hematoma (HCC) [S06.5XAA]  Closed subcapital fracture of right femur, initial encounter (HCC) [S72.011A]  Laceration of scalp, initial encounter [S01.01XA]  Dementia with agitation, unspecified dementia severity, unspecified dementia type (HCC) [F03.911]    Discharge Diagnosis:   Patient Active Problem List   Diagnosis    Alzheimer's dementia with behavioral disturbance (HCC)    Severe manic bipolar I disorder with psychotic features (HCC)    Acute subdural hematoma (HCC)    Laceration of scalp, initial encounter    Dementia with agitation, unspecified dementia severity, unspecified dementia type (HCC)    Closed subcapital fracture of right femur, initial encounter (HCC)    Subdural hematoma (HCC)       Reason for Admission:     Acute subdural hematoma    Physical Exam:     General:unresponsive   HEENT: EOMI PERRLA, atraumatic normocephalic  Cardiac: S1-S2 appreciated  Lungs: Good air entry bilaterally clear to auscultation  Abdomen: Soft nontender nondistended positive bowel sounds  Ext: Peripheral pulses are positive  Neuro: No focal deficits noted  Psych: Normal mood  Skin: No rashes noted  MSK: Full range of motion intact      Hospital Course:     Mechanical fall with head injury and scalp laceration with right parietal small subdural hematoma.  -Imaging reviewed consistent with small subdural hematoma on the right parietal side  -Appreciate neurology and neurosurgery recommendations  -Discussed with patient's  once more comfort care at this time     Right femur subcapital fracture.  -Continue pain control  -Appreciate Ortho surgery  recommendations  -Patient high risk for surgery at this time  -I recommend hemiarthroplasty for pain control and palliation  - continue IV analgesics impressively  - does not want surgery at this time      History of paroxysmal atrial fibrillation.  -Cardiology is on board appreciate mentations  -Patient has a nonfunctioning pacemaker  -History of AV block     Hypertension.  Advanced dementia.     Goals of care discussion:  at this time would like to go with more comfort measures.  And despite looking into hospice.  Will administer Haldol IV 2 mg at this time for agitation continue aggressive IV analgesics.  Appreciate recommendations from neurosurgery orthopedic surgery as well as cardiology  Prognosis is guarded we will continue monitor closely    Plan for outpatient Hospice.      History of Present Illness:    Per admitting:   The patient is an 84-year-old  female with advanced dementia. Was brought in today from memory care unit after she was found lying on the floor in the bathroom. Unwitnessed fall with scalp laceration. In the emergency room, CBC showed white blood cell count of 16.8 with left shift. Chemistry was unremarkable. Liver function test normal. CT scan of the cervical spine showed degenerative joint disease but no acute fracture. CT scan of the brain showed small right subdural hematoma overlying the right parietal high convexity without significant mass effect or midline shift. The patient required 7 staples for scalp laceration. X-ray of the pelvis showed right femur subcapital fracture, acute. Chest x-ray still pending. Urinalysis still pending. The patient will be admitted to progressive care unit for further management     Disposition: Hospice    Discharge Condition: critical    Discharge Medications:   Current Discharge Medication List        START taking these medications    Details   fentaNYL 50 MCG/HR Transdermal Patch 72 Hr Place 1 patch onto the skin every third  day.  Qty: 1 patch, Refills: 0    Associated Diagnoses: Closed subcapital fracture of right femur, initial encounter ([facility])           CONTINUE these medications which have NOT CHANGED    Details   docusate sodium 100 MG Oral Cap Take 1 capsule by mouth daily.      Melatonin 5 MG Oral Tab Take 2 tablets (10 mg total) by mouth every evening.      !! QUEtiapine 50 MG Oral Tab Take 1 tablet (50 mg total) by mouth in the morning and 1 tablet (50 mg total) before bedtime. Takes 50mg at 7am, 12pm, and 4pm.      Acetaminophen 325 MG Oral Cap Take 650 mg by mouth every 6 (six) hours as needed.      polyethylene glycol, PEG 3350, 17 g Oral Powd Pack Take 17 g by mouth daily as needed.      sertraline 100 MG Oral Tab Take 1 tablet (100 mg total) by mouth daily.      !! QUEtiapine 50 MG Oral Tab Take 1 tablet (50 mg total) by mouth Noon. Takes 50mg at 7am, 12pm, and 4pm      lamoTRIgine 25 MG Oral Tab Take 2 tablets (50 mg total) by mouth daily.      diclofenac 1 % External Gel Apply 1 Application topically 4 (four) times daily as needed.      mirtazapine 7.5 MG Oral Tab Take 1 tablet (7.5 mg total) by mouth nightly.      LORazepam 0.5 MG Oral Tab Take 1 tablet (0.5 mg total) by mouth 2 (two) times daily as needed for Anxiety.      ondansetron (ZOFRAN) 4 mg tablet Take 1 tablet (4 mg total) by mouth daily as needed for Nausea.      omeprazole 20 MG Oral Capsule Delayed Release Take 1 capsule (20 mg total) by mouth every morning.      magnesium oxide 400 MG Oral Tab Take 1 tablet (400 mg total) by mouth daily.  Qty: 30 tablet, Refills: 0      metoprolol succinate ER 25 MG Oral Tablet 24 Hr Take 1 tablet (25 mg total) by mouth daily.  Qty: 30 tablet, Refills: 0      potassium chloride 20 MEQ Oral Tab CR Take 1 tablet (20 mEq total) by mouth 2 (two) times daily.  Qty: 60 tablet, Refills: 0      Vitamin D3, Cholecalciferol, 25 MCG (1000 UT) Oral Tab Take 1 tablet (1,000 Units total) by mouth daily.  Qty: 30 tablet, Refills: 0       amLODIPine 5 MG Oral Tab Take 1 tablet (5 mg total) by mouth daily.  Qty: 30 tablet, Refills: 0      cyanocobalamine 1000 MCG Oral Tab Take 1 tablet (1,000 mcg total) by mouth daily.  Qty: 30 tablet, Refills: 0      simvastatin 40 MG Oral Tab Take 1 tablet (40 mg total) by mouth nightly.  Qty: 30 tablet, Refills: 0       !! - Potential duplicate medications found. Please discuss with provider.        STOP taking these medications       Flaxseed, Linseed, (FLAXSEED OIL) 1200 MG Oral Cap              Total dc time > 30 min    Sana Ervin MD  2/3/2025  12:47 PM     Hospital Discharge Diagnoses:  Acute subdural hematoma    Lace+ Score: 68  59-90 High Risk  29-58 Medium Risk  0-28   Low Risk.    TCM Follow-Up Recommendation:  LACE > 58: High Risk of readmission after discharge from the hospital.

## 2025-02-03 NOTE — PROGRESS NOTES
[facility]  part of [facility]    Progress Note    Kalie Kendall Patient Status:  Inpatient    1940 MRN [id number]   Location [facility] 2W/SW Attending Sana Ervin MD   Hosp Day # 3 PCP Candy Shay DO,      Chief Complaint:     Acute subdural hematoma    Subjective:   Subjective:    Patient seen and examined agitated confused   at bedside  Unable to communicate and obtain ROS from the patient due to clinical condition    Objective:   Blood pressure 128/50, pulse 99, temperature 97.6 °F (36.4 °C), temperature source Temporal, resp. rate 12, height 5' 4\" (1.626 m), weight 131 lb 9.8 oz (59.7 kg), SpO2 98%.  Physical Exam    General: Patient is agitated confused not responsive to verbal stimuli   HEENT:atraumatic normocephalic  Cardiac: S1-S2 appreciated  Lungs: Good air entry bilaterally clear to auscultation  Abdomen: Soft nontender nondistended positive bowel sounds      Results:   Lab Results   Component Value Date    WBC 13.0 (H) 2025    HGB 11.7 (L) 2025    HCT 35.5 2025    .0 2025    CREATSERUM 0.79 2025    BUN 22 2025     2025    K 3.5 2025     2025    CO2 26.0 2025     (H) 2025    CA 8.7 2025    ALB 4.7 2025    ALKPHO 125 2025    BILT 0.8 2025    TP 7.8 2025    AST 28 2025    ALT 17 2025    PTT 29.9 2025    INR 1.06 2025    TSH 1.420 2022    ETOH <3 2022       No results found.        Assessment & Plan:     Mechanical fall with head injury and scalp laceration with right parietal small subdural hematoma.  -Imaging reviewed consistent with small subdural hematoma on the right parietal side  -Appreciate neurology and neurosurgery recommendations  -Discussed with patient's  once more comfort care at this time    Right femur subcapital fracture.  -Continue pain control  -Appreciate Ortho  surgery recommendations  -Patient high risk for surgery at this time  -I recommend hemiarthroplasty for pain control and palliation  - continue IV analgesics impressively  - does not want surgery at this time     History of paroxysmal atrial fibrillation.  -Cardiology is on board appreciate mentations  -Patient has a nonfunctioning pacemaker  -History of AV block    Hypertension.  Advanced dementia.    Goals of care discussion:  at this time would like to go with more comfort measures.  And despite looking into hospice.  Will administer Haldol IV 2 mg at this time for agitation continue aggressive IV analgesics.  Appreciate recommendations from neurosurgery orthopedic surgery as well as cardiology  Prognosis is guarded we will continue monitor closely    Discussed with family they would like to go with hospice.    Greater than 55 minutes spent with coordination and discussion with family        Sana Ervin MD

## 2025-02-03 NOTE — CM/SW NOTE
CM followed up on dc plan per previous CM documentation. Plan is [place] [facility].     CM confirmed with Venkatesh at [facility] that all consents have been signed and plan is for patient to dc to facility this afternoon.     CM confirmed with SANDRINE Rubio at [facility] that she is aware of plan and confirms they can accommodate patient today.     Patient's RN updated and aware copy of POLST form needs to be sent for transport.     RN Report #: [id number]    Plan: [facility] [place] with [facility] today via [facility] at 1pm    Cathi Demarco, RN, BSN

## 2025-02-03 NOTE — DISCHARGE INSTRUCTIONS
Further orders per hospice for comfort needs   Will provide prescription for 72 hour fentanyl patch x1 for pain control   Can continue po meds as reconciled at discharge if awake enough to take safely

## 2025-02-03 NOTE — PLAN OF CARE
Problem: HEMATOLOGIC - ADULT  Goal: Maintains hematologic stability  Description: INTERVENTIONS  - Assess for signs and symptoms of bleeding or hemorrhage  - Monitor labs and vital signs for trends  - Administer supportive blood products/factors, fluids and medications as ordered and appropriate  - Administer supportive blood products/factors as ordered and appropriate  Outcome: Progressing     Problem: MUSCULOSKELETAL - ADULT  Goal: Return mobility to safest level of function  Description: INTERVENTIONS:  - Assess patient stability and activity tolerance for standing, transferring and ambulating w/ or w/o assistive devices  - Assist with transfers and ambulation using safe patient handling equipment as needed  - Ensure adequate protection for wounds/incisions during mobilization  - Obtain PT/OT consults as needed  - Advance activity as appropriate  - Communicate ordered activity level and limitations with patient/family  Outcome: Progressing     Problem: NEUROLOGICAL - ADULT  Goal: Achieves stable or improved neurological status  Description: INTERVENTIONS  - Assess for and report changes in neurological status  - Initiate measures to prevent increased intracranial pressure  - Maintain blood pressure and fluid volume within ordered parameters to optimize cerebral perfusion and minimize risk of hemorrhage  - Monitor temperature, glucose, and sodium. Initiate appropriate interventions as ordered  Outcome: Progressing